# Patient Record
Sex: FEMALE | Race: WHITE | Employment: FULL TIME | URBAN - METROPOLITAN AREA
[De-identification: names, ages, dates, MRNs, and addresses within clinical notes are randomized per-mention and may not be internally consistent; named-entity substitution may affect disease eponyms.]

---

## 2017-02-15 PROBLEM — S52.124A CLOSED NONDISPLACED FRACTURE OF HEAD OF RIGHT RADIUS: Status: ACTIVE | Noted: 2017-02-15

## 2017-02-15 PROBLEM — S53.401A SPRAIN OF RIGHT ELBOW: Status: ACTIVE | Noted: 2017-02-15

## 2017-10-02 PROBLEM — E55.9 VITAMIN D DEFICIENCY: Status: ACTIVE | Noted: 2017-10-02

## 2017-10-02 PROBLEM — Z91.199 NON-COMPLIANCE WITH TREATMENT: Status: ACTIVE | Noted: 2017-10-02

## 2017-10-02 PROBLEM — F41.9 ANXIETY: Status: ACTIVE | Noted: 2017-10-02

## 2018-03-06 ENCOUNTER — HOSPITAL ENCOUNTER (INPATIENT)
Dept: INPATIENT UNIT | Age: 53
LOS: 7 days | Discharge: HOME HEALTH | DRG: 481 | End: 2018-03-13
Attending: EMERGENCY MEDICINE | Admitting: ORTHOPAEDIC SURGERY
Payer: COMMERCIAL

## 2018-03-06 DIAGNOSIS — S72.002A CLOSED FRACTURE OF LEFT HIP, INITIAL ENCOUNTER (HCC): ICD-10-CM

## 2018-03-06 DIAGNOSIS — S72.002A CLOSED FRACTURE OF NECK OF LEFT FEMUR, INITIAL ENCOUNTER (HCC): Primary | ICD-10-CM

## 2018-03-06 PROCEDURE — 9990 CHARGE CONVERSION

## 2018-03-06 PROCEDURE — 99285 EMERGENCY DEPT VISIT HI MDM: CPT

## 2018-03-06 PROCEDURE — 82962 GLUCOSE BLOOD TEST: CPT

## 2018-03-06 PROCEDURE — 51702 INSERT TEMP BLADDER CATH: CPT

## 2018-03-06 PROCEDURE — 93005 ELECTROCARDIOGRAM TRACING: CPT

## 2018-03-06 RX ORDER — FENTANYL CITRATE 50 UG/ML
50 INJECTION, SOLUTION INTRAMUSCULAR; INTRAVENOUS ONCE
Status: COMPLETED | OUTPATIENT
Start: 2018-03-06 | End: 2018-03-06

## 2018-03-06 RX ADMIN — FENTANYL CITRATE 50 MCG: 50 INJECTION, SOLUTION INTRAMUSCULAR; INTRAVENOUS at 23:54

## 2018-03-06 ASSESSMENT — PAIN SCALES - GENERAL
PAINLEVEL_OUTOF10: 10
PAINLEVEL_OUTOF10: 3

## 2018-03-06 ASSESSMENT — PAIN DESCRIPTION - DESCRIPTORS: DESCRIPTORS: ACHING

## 2018-03-06 ASSESSMENT — ENCOUNTER SYMPTOMS
WHEEZING: 0
EYE REDNESS: 0
NAUSEA: 0
BACK PAIN: 0
EYE PAIN: 0
SINUS PRESSURE: 0
SHORTNESS OF BREATH: 0
EYE DISCHARGE: 0
ABDOMINAL DISTENTION: 0
VOMITING: 0
COUGH: 0
SORE THROAT: 0
DIARRHEA: 0

## 2018-03-06 ASSESSMENT — PAIN DESCRIPTION - ORIENTATION: ORIENTATION: LEFT

## 2018-03-06 ASSESSMENT — PAIN DESCRIPTION - PAIN TYPE: TYPE: ACUTE PAIN

## 2018-03-06 ASSESSMENT — PAIN DESCRIPTION - FREQUENCY: FREQUENCY: CONTINUOUS

## 2018-03-06 ASSESSMENT — PAIN DESCRIPTION - LOCATION: LOCATION: HIP

## 2018-03-07 LAB
ABO/RH: NORMAL
ALBUMIN SERPL-MCNC: 4 G/DL (ref 3.5–5.2)
ALP BLD-CCNC: 86 U/L (ref 35–104)
ALT SERPL-CCNC: 10 U/L (ref 0–32)
ANION GAP SERPL CALCULATED.3IONS-SCNC: 12 MMOL/L (ref 7–16)
ANTIBODY SCREEN: NORMAL
APTT: 31.2 SEC (ref 24.5–35.1)
AST SERPL-CCNC: 16 U/L (ref 0–31)
BASOPHILS ABSOLUTE: 0.06 E9/L (ref 0–0.2)
BASOPHILS RELATIVE PERCENT: 0.4 % (ref 0–2)
BILIRUB SERPL-MCNC: 0.3 MG/DL (ref 0–1.2)
BUN BLDV-MCNC: 15 MG/DL (ref 6–20)
CALCIUM SERPL-MCNC: 9.1 MG/DL (ref 8.6–10.2)
CHLORIDE BLD-SCNC: 103 MMOL/L (ref 98–107)
CO2: 27 MMOL/L (ref 22–29)
CREAT SERPL-MCNC: 0.8 MG/DL (ref 0.5–1)
EKG ATRIAL RATE: 68 BPM
EKG P AXIS: 24 DEGREES
EKG P-R INTERVAL: 104 MS
EKG Q-T INTERVAL: 434 MS
EKG QRS DURATION: 100 MS
EKG QTC CALCULATION (BAZETT): 461 MS
EKG R AXIS: 67 DEGREES
EKG T AXIS: 59 DEGREES
EKG VENTRICULAR RATE: 68 BPM
EOSINOPHILS ABSOLUTE: 0.12 E9/L (ref 0.05–0.5)
EOSINOPHILS RELATIVE PERCENT: 0.8 % (ref 0–6)
GFR AFRICAN AMERICAN: >60
GFR NON-AFRICAN AMERICAN: >60 ML/MIN/1.73
GLUCOSE BLD-MCNC: 96 MG/DL (ref 74–109)
HBA1C MFR BLD: 10 % (ref 4.8–5.9)
HCT VFR BLD CALC: 38 % (ref 34–48)
HEMOGLOBIN: 12.7 G/DL (ref 11.5–15.5)
IMMATURE GRANULOCYTES #: 0.1 E9/L
IMMATURE GRANULOCYTES %: 0.6 % (ref 0–5)
INR BLD: 1
LYMPHOCYTES ABSOLUTE: 1.49 E9/L (ref 1.5–4)
LYMPHOCYTES RELATIVE PERCENT: 9.3 % (ref 20–42)
MCH RBC QN AUTO: 31.8 PG (ref 26–35)
MCHC RBC AUTO-ENTMCNC: 33.4 % (ref 32–34.5)
MCV RBC AUTO: 95.2 FL (ref 80–99.9)
METER GLUCOSE: 102 MG/DL (ref 70–110)
METER GLUCOSE: 132 MG/DL (ref 70–110)
METER GLUCOSE: 141 MG/DL (ref 70–110)
METER GLUCOSE: 156 MG/DL (ref 70–110)
METER GLUCOSE: 171 MG/DL (ref 70–110)
METER GLUCOSE: 384 MG/DL (ref 70–110)
METER GLUCOSE: 96 MG/DL (ref 70–110)
MONOCYTES ABSOLUTE: 0.67 E9/L (ref 0.1–0.95)
MONOCYTES RELATIVE PERCENT: 4.2 % (ref 2–12)
NEUTROPHILS ABSOLUTE: 13.5 E9/L (ref 1.8–7.3)
NEUTROPHILS RELATIVE PERCENT: 84.7 % (ref 43–80)
PDW BLD-RTO: 12.8 FL (ref 11.5–15)
PLATELET # BLD: 258 E9/L (ref 130–450)
PMV BLD AUTO: 10.2 FL (ref 7–12)
POTASSIUM SERPL-SCNC: 3.9 MMOL/L (ref 3.5–5)
PROTHROMBIN TIME: 10.9 SEC (ref 9.3–12.4)
RBC # BLD: 3.99 E12/L (ref 3.5–5.5)
SODIUM BLD-SCNC: 142 MMOL/L (ref 132–146)
TOTAL PROTEIN: 7 G/DL (ref 6.4–8.3)
WBC # BLD: 15.9 E9/L (ref 4.5–11.5)

## 2018-03-07 PROCEDURE — 86900 BLOOD TYPING SEROLOGIC ABO: CPT

## 2018-03-07 PROCEDURE — 9990 CHARGE CONVERSION

## 2018-03-07 PROCEDURE — 86901 BLOOD TYPING SEROLOGIC RH(D): CPT

## 2018-03-07 PROCEDURE — 80053 COMPREHEN METABOLIC PANEL: CPT

## 2018-03-07 PROCEDURE — 85025 COMPLETE CBC W/AUTO DIFF WBC: CPT

## 2018-03-07 PROCEDURE — 76000 FLUOROSCOPY <1 HR PHYS/QHP: CPT

## 2018-03-07 PROCEDURE — 86850 RBC ANTIBODY SCREEN: CPT

## 2018-03-07 PROCEDURE — 85610 PROTHROMBIN TIME: CPT

## 2018-03-07 PROCEDURE — 0QS736Z REPOSITION LEFT UPPER FEMUR WITH INTRAMEDULLARY INTERNAL FIXATION DEVICE, PERCUTANEOUS APPROACH: ICD-10-PCS | Performed by: ORTHOPAEDIC SURGERY

## 2018-03-07 PROCEDURE — 83036 HEMOGLOBIN GLYCOSYLATED A1C: CPT

## 2018-03-07 PROCEDURE — 82962 GLUCOSE BLOOD TEST: CPT

## 2018-03-07 PROCEDURE — 85730 THROMBOPLASTIN TIME PARTIAL: CPT

## 2018-03-07 PROCEDURE — 36415 COLL VENOUS BLD VENIPUNCTURE: CPT

## 2018-03-07 RX ORDER — DEXTROSE MONOHYDRATE 50 MG/ML
100 INJECTION, SOLUTION INTRAVENOUS PRN
Status: DISCONTINUED | OUTPATIENT
Start: 2018-03-07 | End: 2018-03-07 | Stop reason: CLARIF

## 2018-03-07 RX ORDER — NICOTINE 21 MG/24HR
1 PATCH, TRANSDERMAL 24 HOURS TRANSDERMAL DAILY
Status: DISCONTINUED | OUTPATIENT
Start: 2018-03-07 | End: 2018-03-08

## 2018-03-07 RX ORDER — MORPHINE SULFATE 10 MG/ML
2 INJECTION, SOLUTION INTRAMUSCULAR; INTRAVENOUS
Status: DISCONTINUED | OUTPATIENT
Start: 2018-03-07 | End: 2018-03-07

## 2018-03-07 RX ORDER — HYDROCODONE BITARTRATE AND ACETAMINOPHEN 5; 325 MG/1; MG/1
2 TABLET ORAL EVERY 4 HOURS PRN
Status: DISCONTINUED | OUTPATIENT
Start: 2018-03-07 | End: 2018-03-13 | Stop reason: HOSPADM

## 2018-03-07 RX ORDER — ACETAMINOPHEN 325 MG/1
650 TABLET ORAL EVERY 4 HOURS PRN
Status: DISCONTINUED | OUTPATIENT
Start: 2018-03-07 | End: 2018-03-13 | Stop reason: HOSPADM

## 2018-03-07 RX ORDER — DOCUSATE SODIUM 100 MG/1
100 CAPSULE, LIQUID FILLED ORAL 2 TIMES DAILY
Status: DISCONTINUED | OUTPATIENT
Start: 2018-03-07 | End: 2018-03-13 | Stop reason: HOSPADM

## 2018-03-07 RX ORDER — ONDANSETRON 2 MG/ML
4 INJECTION INTRAMUSCULAR; INTRAVENOUS EVERY 6 HOURS PRN
Status: DISCONTINUED | OUTPATIENT
Start: 2018-03-07 | End: 2018-03-13 | Stop reason: HOSPADM

## 2018-03-07 RX ORDER — LISINOPRIL 5 MG/1
2.5 TABLET ORAL DAILY
Status: DISCONTINUED | OUTPATIENT
Start: 2018-03-07 | End: 2018-03-07 | Stop reason: CLARIF

## 2018-03-07 RX ORDER — HYDROCODONE BITARTRATE AND ACETAMINOPHEN 5; 325 MG/1; MG/1
1 TABLET ORAL EVERY 4 HOURS PRN
Status: DISCONTINUED | OUTPATIENT
Start: 2018-03-07 | End: 2018-03-13 | Stop reason: HOSPADM

## 2018-03-07 RX ORDER — SODIUM CHLORIDE 0.9 % (FLUSH) 0.9 %
10 SYRINGE (ML) INJECTION PRN
Status: DISCONTINUED | OUTPATIENT
Start: 2018-03-07 | End: 2018-03-07

## 2018-03-07 RX ORDER — NICOTINE POLACRILEX 4 MG
15 LOZENGE BUCCAL PRN
Status: DISCONTINUED | OUTPATIENT
Start: 2018-03-07 | End: 2018-03-07

## 2018-03-07 RX ORDER — MORPHINE SULFATE 10 MG/ML
4 INJECTION, SOLUTION INTRAMUSCULAR; INTRAVENOUS
Status: DISCONTINUED | OUTPATIENT
Start: 2018-03-07 | End: 2018-03-07

## 2018-03-07 RX ORDER — NICOTINE 21 MG/24HR
1 PATCH, TRANSDERMAL 24 HOURS TRANSDERMAL EVERY 24 HOURS
Status: DISCONTINUED | OUTPATIENT
Start: 2018-03-07 | End: 2018-03-07 | Stop reason: CLARIF

## 2018-03-07 RX ORDER — SODIUM CHLORIDE 0.9 % (FLUSH) 0.9 %
10 SYRINGE (ML) INJECTION PRN
Status: DISCONTINUED | OUTPATIENT
Start: 2018-03-07 | End: 2018-03-13 | Stop reason: HOSPADM

## 2018-03-07 RX ORDER — FENTANYL CITRATE 50 UG/ML
INJECTION, SOLUTION INTRAMUSCULAR; INTRAVENOUS
Status: DISPENSED
Start: 2018-03-07 | End: 2018-03-08

## 2018-03-07 RX ORDER — ATORVASTATIN CALCIUM 10 MG/1
10 TABLET, FILM COATED ORAL DAILY
Status: DISCONTINUED | OUTPATIENT
Start: 2018-03-07 | End: 2018-03-07 | Stop reason: CLARIF

## 2018-03-07 RX ORDER — HYDROMORPHONE HCL 110MG/55ML
0.5 PATIENT CONTROLLED ANALGESIA SYRINGE INTRAVENOUS EVERY 5 MIN PRN
Status: DISCONTINUED | OUTPATIENT
Start: 2018-03-07 | End: 2018-03-07

## 2018-03-07 RX ORDER — NICOTINE POLACRILEX 4 MG
15 LOZENGE BUCCAL PRN
Status: DISCONTINUED | OUTPATIENT
Start: 2018-03-07 | End: 2018-03-07 | Stop reason: CLARIF

## 2018-03-07 RX ORDER — ACETAMINOPHEN 325 MG/1
650 TABLET ORAL EVERY 4 HOURS PRN
Status: DISCONTINUED | OUTPATIENT
Start: 2018-03-07 | End: 2018-03-07 | Stop reason: SDUPTHER

## 2018-03-07 RX ORDER — ONDANSETRON 2 MG/ML
4 INJECTION INTRAMUSCULAR; INTRAVENOUS EVERY 6 HOURS PRN
Status: DISCONTINUED | OUTPATIENT
Start: 2018-03-07 | End: 2018-03-07 | Stop reason: SDUPTHER

## 2018-03-07 RX ORDER — SODIUM CHLORIDE, SODIUM LACTATE, POTASSIUM CHLORIDE, CALCIUM CHLORIDE 600; 310; 30; 20 MG/100ML; MG/100ML; MG/100ML; MG/100ML
INJECTION, SOLUTION INTRAVENOUS CONTINUOUS
Status: DISCONTINUED | OUTPATIENT
Start: 2018-03-07 | End: 2018-03-07

## 2018-03-07 RX ORDER — SODIUM CHLORIDE 0.9 % (FLUSH) 0.9 %
10 SYRINGE (ML) INJECTION EVERY 12 HOURS SCHEDULED
Status: DISCONTINUED | OUTPATIENT
Start: 2018-03-07 | End: 2018-03-07

## 2018-03-07 RX ORDER — DEXTROSE MONOHYDRATE 50 MG/ML
100 INJECTION, SOLUTION INTRAVENOUS PRN
Status: DISCONTINUED | OUTPATIENT
Start: 2018-03-07 | End: 2018-03-12 | Stop reason: SDUPTHER

## 2018-03-07 RX ORDER — HYDROMORPHONE HCL 110MG/55ML
0.25 PATIENT CONTROLLED ANALGESIA SYRINGE INTRAVENOUS EVERY 5 MIN PRN
Status: DISCONTINUED | OUTPATIENT
Start: 2018-03-07 | End: 2018-03-07

## 2018-03-07 RX ORDER — SODIUM CHLORIDE 9 MG/ML
INJECTION, SOLUTION INTRAVENOUS CONTINUOUS
Status: DISCONTINUED | OUTPATIENT
Start: 2018-03-07 | End: 2018-03-10

## 2018-03-07 RX ORDER — MORPHINE SULFATE 10 MG/ML
6 INJECTION, SOLUTION INTRAMUSCULAR; INTRAVENOUS
Status: DISCONTINUED | OUTPATIENT
Start: 2018-03-07 | End: 2018-03-11

## 2018-03-07 RX ORDER — ALBUTEROL SULFATE 90 UG/1
2 AEROSOL, METERED RESPIRATORY (INHALATION) EVERY 6 HOURS PRN
Status: DISCONTINUED | OUTPATIENT
Start: 2018-03-07 | End: 2018-03-13 | Stop reason: HOSPADM

## 2018-03-07 RX ORDER — DEXTROSE MONOHYDRATE 25 G/50ML
12.5 INJECTION, SOLUTION INTRAVENOUS PRN
Status: DISCONTINUED | OUTPATIENT
Start: 2018-03-07 | End: 2018-03-07

## 2018-03-07 RX ORDER — FENTANYL CITRATE 50 UG/ML
25 INJECTION, SOLUTION INTRAMUSCULAR; INTRAVENOUS ONCE
Status: DISCONTINUED | OUTPATIENT
Start: 2018-03-07 | End: 2018-03-07

## 2018-03-07 RX ORDER — FENTANYL CITRATE 50 UG/ML
25 INJECTION, SOLUTION INTRAMUSCULAR; INTRAVENOUS EVERY 5 MIN PRN
Status: DISCONTINUED | OUTPATIENT
Start: 2018-03-07 | End: 2018-03-07

## 2018-03-07 RX ORDER — SODIUM CHLORIDE 0.9 % (FLUSH) 0.9 %
10 SYRINGE (ML) INJECTION EVERY 12 HOURS SCHEDULED
Status: DISCONTINUED | OUTPATIENT
Start: 2018-03-07 | End: 2018-03-07 | Stop reason: CLARIF

## 2018-03-07 RX ORDER — DEXTROSE MONOHYDRATE 25 G/50ML
12.5 INJECTION, SOLUTION INTRAVENOUS PRN
Status: DISCONTINUED | OUTPATIENT
Start: 2018-03-07 | End: 2018-03-07 | Stop reason: CLARIF

## 2018-03-07 RX ORDER — ONDANSETRON 2 MG/ML
4 INJECTION INTRAMUSCULAR; INTRAVENOUS
Status: DISCONTINUED | OUTPATIENT
Start: 2018-03-07 | End: 2018-03-07

## 2018-03-07 RX ORDER — INSULIN GLARGINE 100 [IU]/ML
25 INJECTION, SOLUTION SUBCUTANEOUS NIGHTLY
Status: DISCONTINUED | OUTPATIENT
Start: 2018-03-07 | End: 2018-03-09

## 2018-03-07 RX ORDER — MORPHINE SULFATE 10 MG/ML
2 INJECTION, SOLUTION INTRAMUSCULAR; INTRAVENOUS
Status: DISCONTINUED | OUTPATIENT
Start: 2018-03-07 | End: 2018-03-07 | Stop reason: HOSPADM

## 2018-03-07 RX ORDER — LEVOTHYROXINE SODIUM 112 UG/1
112 TABLET ORAL DAILY
Status: DISCONTINUED | OUTPATIENT
Start: 2018-03-07 | End: 2018-03-07 | Stop reason: CLARIF

## 2018-03-07 RX ORDER — LORAZEPAM 2 MG/ML
1 INJECTION INTRAMUSCULAR ONCE
Status: COMPLETED | OUTPATIENT
Start: 2018-03-07 | End: 2018-03-07

## 2018-03-07 RX ORDER — FENTANYL CITRATE 50 UG/ML
50 INJECTION, SOLUTION INTRAMUSCULAR; INTRAVENOUS EVERY 5 MIN PRN
Status: DISCONTINUED | OUTPATIENT
Start: 2018-03-07 | End: 2018-03-07

## 2018-03-07 RX ORDER — SODIUM CHLORIDE 0.9 % (FLUSH) 0.9 %
10 SYRINGE (ML) INJECTION EVERY 12 HOURS SCHEDULED
Status: DISCONTINUED | OUTPATIENT
Start: 2018-03-07 | End: 2018-03-13 | Stop reason: HOSPADM

## 2018-03-07 RX ORDER — ACETAMINOPHEN 325 MG/1
650 TABLET ORAL EVERY 4 HOURS PRN
Status: DISCONTINUED | OUTPATIENT
Start: 2018-03-07 | End: 2018-03-07 | Stop reason: HOSPADM

## 2018-03-07 RX ADMIN — LORAZEPAM 1 MG: 2 INJECTION INTRAMUSCULAR at 09:14

## 2018-03-07 RX ADMIN — SODIUM CHLORIDE, SODIUM LACTATE, POTASSIUM CHLORIDE, CALCIUM CHLORIDE: 600; 310; 30; 20 INJECTION, SOLUTION INTRAVENOUS at 07:16

## 2018-03-07 RX ADMIN — SODIUM CHLORIDE: 9 INJECTION, SOLUTION INTRAVENOUS at 23:40

## 2018-03-07 RX ADMIN — MORPHINE SULFATE 6 MG: 10 INJECTION, SOLUTION INTRAMUSCULAR; INTRAVENOUS at 09:20

## 2018-03-07 RX ADMIN — INSULIN GLARGINE 25 UNITS: 100 INJECTION, SOLUTION SUBCUTANEOUS at 21:16

## 2018-03-07 RX ADMIN — FENTANYL CITRATE 50 MCG: 50 INJECTION, SOLUTION INTRAMUSCULAR; INTRAVENOUS at 15:30

## 2018-03-07 RX ADMIN — FENTANYL CITRATE 25 MCG: 50 INJECTION, SOLUTION INTRAMUSCULAR; INTRAVENOUS at 15:45

## 2018-03-07 RX ADMIN — MORPHINE SULFATE 4 MG: 10 INJECTION, SOLUTION INTRAMUSCULAR; INTRAVENOUS at 07:26

## 2018-03-07 ASSESSMENT — PAIN DESCRIPTION - PAIN TYPE
TYPE: ACUTE PAIN
TYPE: SURGICAL PAIN

## 2018-03-07 ASSESSMENT — PAIN SCALES - GENERAL
PAINLEVEL_OUTOF10: 10
PAINLEVEL_OUTOF10: 2
PAINLEVEL_OUTOF10: 4
PAINLEVEL_OUTOF10: 10
PAINLEVEL_OUTOF10: 8
PAINLEVEL_OUTOF10: 4
PAINLEVEL_OUTOF10: 4
PAINLEVEL_OUTOF10: 6
PAINLEVEL_OUTOF10: 10

## 2018-03-07 ASSESSMENT — PAIN DESCRIPTION - PROGRESSION
CLINICAL_PROGRESSION: GRADUALLY WORSENING
CLINICAL_PROGRESSION: GRADUALLY IMPROVING
CLINICAL_PROGRESSION: RAPIDLY WORSENING

## 2018-03-07 ASSESSMENT — PAIN DESCRIPTION - FREQUENCY
FREQUENCY: INTERMITTENT
FREQUENCY: CONTINUOUS

## 2018-03-07 ASSESSMENT — PAIN DESCRIPTION - ORIENTATION
ORIENTATION: LEFT

## 2018-03-07 ASSESSMENT — PAIN DESCRIPTION - DESCRIPTORS
DESCRIPTORS: TENDER
DESCRIPTORS: DISCOMFORT;DULL
DESCRIPTORS: ACHING;THROBBING
DESCRIPTORS: SORE

## 2018-03-07 ASSESSMENT — PAIN DESCRIPTION - LOCATION
LOCATION: HIP

## 2018-03-07 ASSESSMENT — PAIN DESCRIPTION - ONSET: ONSET: GRADUAL

## 2018-03-07 NOTE — H&P
w/Device KIT, 1 kit by Does not apply route 4 times daily (with meals and nightly)  lidocaine (LIDODERM) 5 %, Place 1 patch onto the skin daily 12 hours on, 12 hours off. Allergies:  Demerol    Social History:     Smoker this independently. Family History:   No family history on file. PHYSICAL EXAM:  VITALS:  BP (!) 90/52   Pulse 62   Temp 98.6 °F (37 °C) (Oral)   Resp 16   Ht 5' 9\" (1.753 m)   Wt 142 lb (64.4 kg)   LMP 11/08/2011   SpO2 95%   BMI 20.97 kg/m²     HEENT is normal without any trauma. Neck is soft and supple. Chest clear to auscultation with clear breath sounds. Cardiovascular is 2+ both upper lower extremities. She has pain in the outside part of her right elbow but no obvious acute problem or deformity. Abdomen is soft nontender with good bowel sounds. Left hip as severe pain upon any kind of motion she does not have point tenderness around her knee but any time I try to examine the thigh causes pain in her hip. She has no pain around the ankles. ASSESSMENT AND PLAN:      Left Intertrochanteric hip fracture     plan:  Basically the x-rays are an AP and a slight oblique. I need to get better visualization of her femoral neck fracture to see if it could be pinned. If it is displaced she is going to require total hip arthroplasty. It has impaired of the we will get a CT scan would of properly preoperative pain. We discussed the problems with having a broken hip we also discussed fixing it as well as replacing it. We discussed the risks of either medically and with persistent pain and possible leg length discrepancy nonunion and need for further surgery. We also discussed the risk of AVN infection and bursitis.

## 2018-03-07 NOTE — CONSULTS
tablet by mouth Daily 3/2/18  Yes Laurie Lei NP   lisinopril (PRINIVIL;ZESTRIL) 2.5 MG tablet Take 1 tablet by mouth daily 3/2/18  Yes Laurie Lei NP   sertraline (ZOLOFT) 50 MG tablet TAKE 1 TABLET EVERY EVENING 3/2/18  Yes Nara Cash NP   glucose blood VI test strips (ONE TOUCH ULTRA TEST) strip USE TO TEST 3 TO 4 TIMES A DAY AS DIRECTED 3/2/18  Yes Laurie Lei NP   atorvastatin (LIPITOR) 10 MG tablet Take 1 tablet by mouth daily 3/2/18  Yes Laurie Lei NP   nicotine (NICODERM CQ) 21 MG/24HR Place 1 patch onto the skin every 24 hours 3/2/18 3/2/19 Yes Laurie Lei NP   albuterol sulfate HFA (PROAIR HFA) 108 (90 Base) MCG/ACT inhaler Inhale 2 puffs into the lungs every 6 hours as needed for Wheezing 3/2/18  Yes Laurie Lei NP   Blood Glucose Monitoring Suppl (ONE TOUCH ULTRA MINI) w/Device KIT 1 kit by Does not apply route 4 times daily (with meals and nightly) 10/2/17  Yes Silvana HEYDI Villaseñor   lidocaine (LIDODERM) 5 % Place 1 patch onto the skin daily 12 hours on, 12 hours off. 3/2/18   Laurie Lei NP       ALLERGIES:  Demerol    SOCIAL Hx:  Social History     Social History    Marital status:      Spouse name: N/A    Number of children: N/A    Years of education: N/A     Occupational History    Not on file. Social History Main Topics    Smoking status: Current Every Day Smoker     Packs/day: 2.00     Years: 35.00    Smokeless tobacco: Never Used      Comment: smoke 1 1/2 ppd    Alcohol use No    Drug use: No    Sexual activity: Not on file     Other Topics Concern    Not on file     Social History Narrative    No narrative on file       ROS:  General:   Denies chills, fatigue, fever, malaise, night sweats or weight loss    Psychological:   Denies anxiety, disorientation or hallucinations    ENT:    Denies epistaxis, headaches, vertigo or visual changes    Cardiovascular:   Denies any chest pain, irregular heartbeats, or palpitations.  No paroxysmal

## 2018-03-07 NOTE — ED NOTES
Bed: 08  Expected date:   Expected time:   Means of arrival:   Comments:  Gilmar Gonzalez, [facility]  03/06/18 [id number]

## 2018-03-07 NOTE — PLAN OF CARE
Problem: Falls - Risk of  Goal: Absence of falls  Outcome: Ongoing      Problem: Pain:  Goal: Pain level will decrease  Pain level will decrease   Outcome: Ongoing      Problem: Risk for Impaired Skin Integrity  Goal: Tissue integrity - skin and mucous membranes  Structural intactness and normal physiological function of skin and  mucous membranes.    Outcome: Ongoing

## 2018-03-07 NOTE — ANESTHESIA PRE-OP
Department of Anesthesiology  Preprocedure Note       Name:  Romana Kaplan   Age:  45 y.o.  :  1965                                          MRN:  [id number]         Date:  3/7/2018      Surgeon:  Jolynn Mireles    Procedure:  L hip pinning`    Medications prior to admission:   Prior to Admission medications    Medication Sig Start Date End Date Taking?  Authorizing Provider   insulin lispro (HUMALOG KWIKPEN) 100 UNIT/ML pen Inject 10 Units into the skin 3 times daily (before meals) 3/2/18  Yes Blanca Pulido NP   insulin glargine (LANTUS SOLOSTAR) 100 UNIT/ML injection pen Inject 23 Units into the skin nightly 3/2/18  Yes Hailey Cash NP   Insulin Pen Needle (PEN NEEDLES) 31G X 6 MM MISC 1 each by Does not apply route daily 3/2/18  Yes Blanca Pulido NP   Lancets MISC Test TID IDDM 3/2/18  Yes Blanca Pulido NP   levothyroxine (SYNTHROID) 112 MCG tablet Take 1 tablet by mouth Daily 3/2/18  Yes Blanca Pulido NP   lisinopril (PRINIVIL;ZESTRIL) 2.5 MG tablet Take 1 tablet by mouth daily 3/2/18  Yes Blanca Pulido NP   sertraline (ZOLOFT) 50 MG tablet TAKE 1 TABLET EVERY EVENING 3/2/18  Yes Hailey Cash NP   glucose blood VI test strips (ONE TOUCH ULTRA TEST) strip USE TO TEST 3 TO 4 TIMES A DAY AS DIRECTED 3/2/18  Yes Blanca Pulido NP   atorvastatin (LIPITOR) 10 MG tablet Take 1 tablet by mouth daily 3/2/18  Yes Blanca Pulido NP   nicotine (NICODERM CQ) 21 MG/24HR Place 1 patch onto the skin every 24 hours 3/2/18 3/2/19 Yes Hailey Hardy NP   albuterol sulfate HFA (PROAIR HFA) 108 (90 Base) MCG/ACT inhaler Inhale 2 puffs into the lungs every 6 hours as needed for Wheezing 3/2/18  Yes Hailey Hardy NP   Blood Glucose Monitoring Suppl (ONE TOUCH ULTRA MINI) w/Device KIT 1 kit by Does not apply route 4 times daily (with meals and nightly) 10/2/17  Yes Kenneth Segovia CNP   lidocaine (LIDODERM) 5 % Place 1 patch onto the skin daily 12 hours on, 12 hours off. 3/2/18   Blanca Pulido NP 18 144 lb (65.3 kg)   17 141 lb (64 kg)     Body mass index is 20.97 kg/m². Anesthesia Evaluation  Patient summary reviewed no history of anesthetic complications:   Airway: Mallampati: III  TM distance: >3 FB   Neck ROM: full   Dental:    (+) edentulous      Pulmonary:Negative Pulmonary ROS breath sounds clear to auscultation                             Cardiovascular:Negative CV ROS            Rhythm: regular  Rate: normal                    Neuro/Psych:   (+) neuromuscular disease:,             GI/Hepatic/Renal:             Endo/Other:    (+) Diabetesusing insulin, hypothyroidism::., .                 Abdominal:           Vascular:                                       Vital Signs (Current)   Vitals:    18 08   BP: (!) 90/52   Pulse: 62   Resp: 16   Temp: 98.6 °F (37 °C)   SpO2: 95%     Vital Signs Statistics (for past 48 hrs)     Temp  Av.5 °F (36.9 °C)  Min: 98.3 °F (36.8 °C)   Min taken time: 18  Max: 98.7 °F (37.1 °C)   Max taken time: 18 0005  Pulse  Av  Min: 58   Min taken time: 18 0803  Max: 68   Max taken time: 18 2201  Resp  Av.5  Min: 12   Min taken time: 18  Max: 25   Max taken time: 18 0045  BP  Min: 90/52   Min taken time: 18 08  Max: 137/69   Max taken time: 18 2201  SpO2  Av.8 %  Min: 95 %   Min taken time: 18 08  Max: 97 %   Max taken time: 18 0045    BP Readings from Last 3 Encounters:   18 (!) 90/52   18 122/86   17 110/62     BMI  Body mass index is 20.97 kg/m². Estimated body mass index is 20.97 kg/m² as calculated from the following:    Height as of this encounter: 5' 9\" (1.753 m). Weight as of this encounter: 142 lb (64.4 kg).     CBC   Lab Results   Component Value Date    WBC 15.9 2018    RBC 3.99 2018    HGB 12.7 2018    HCT 38.0 2018    MCV 95.2 2018    RDW 12.8 2018     2018     CMP    Lab Results

## 2018-03-08 LAB
HCT VFR BLD CALC: 31.8 % (ref 34–48)
HEMOGLOBIN: 10.3 G/DL (ref 11.5–15.5)
METER GLUCOSE: 102 MG/DL (ref 70–110)
METER GLUCOSE: 188 MG/DL (ref 70–110)
METER GLUCOSE: 257 MG/DL (ref 70–110)
METER GLUCOSE: 90 MG/DL (ref 70–110)

## 2018-03-08 PROCEDURE — 97530 THERAPEUTIC ACTIVITIES: CPT

## 2018-03-08 PROCEDURE — 9990 CHARGE CONVERSION

## 2018-03-08 PROCEDURE — 73501 X-RAY EXAM HIP UNI 1 VIEW: CPT

## 2018-03-08 PROCEDURE — 85014 HEMATOCRIT: CPT

## 2018-03-08 PROCEDURE — 36415 COLL VENOUS BLD VENIPUNCTURE: CPT

## 2018-03-08 PROCEDURE — 97116 GAIT TRAINING THERAPY: CPT

## 2018-03-08 PROCEDURE — 97165 OT EVAL LOW COMPLEX 30 MIN: CPT

## 2018-03-08 PROCEDURE — 97110 THERAPEUTIC EXERCISES: CPT

## 2018-03-08 PROCEDURE — 82962 GLUCOSE BLOOD TEST: CPT

## 2018-03-08 PROCEDURE — 73502 X-RAY EXAM HIP UNI 2-3 VIEWS: CPT

## 2018-03-08 PROCEDURE — 73552 X-RAY EXAM OF FEMUR 2/>: CPT

## 2018-03-08 PROCEDURE — 97162 PT EVAL MOD COMPLEX 30 MIN: CPT

## 2018-03-08 PROCEDURE — 73700 CT LOWER EXTREMITY W/O DYE: CPT

## 2018-03-08 PROCEDURE — G8988 SELF CARE GOAL STATUS: HCPCS

## 2018-03-08 PROCEDURE — G8987 SELF CARE CURRENT STATUS: HCPCS

## 2018-03-08 PROCEDURE — 71045 X-RAY EXAM CHEST 1 VIEW: CPT

## 2018-03-08 PROCEDURE — 85018 HEMOGLOBIN: CPT

## 2018-03-08 PROCEDURE — 97535 SELF CARE MNGMENT TRAINING: CPT

## 2018-03-08 RX ORDER — HYDROCODONE BITARTRATE AND ACETAMINOPHEN 5; 325 MG/1; MG/1
1 TABLET ORAL EVERY 4 HOURS PRN
Qty: 42 TABLET | Refills: 0 | Status: SHIPPED | OUTPATIENT
Start: 2018-03-08 | End: 2018-03-15

## 2018-03-08 RX ORDER — NICOTINE 21 MG/24HR
1 PATCH, TRANSDERMAL 24 HOURS TRANSDERMAL DAILY
Status: DISCONTINUED | OUTPATIENT
Start: 2018-03-08 | End: 2018-03-09

## 2018-03-08 RX ADMIN — HYDROCODONE BITARTRATE AND ACETAMINOPHEN 2 TABLET: 5; 325 TABLET ORAL at 19:27

## 2018-03-08 RX ADMIN — HYDROCODONE BITARTRATE AND ACETAMINOPHEN 1 TABLET: 5; 325 TABLET ORAL at 11:49

## 2018-03-08 RX ADMIN — HYDROCODONE BITARTRATE AND ACETAMINOPHEN 1 TABLET: 5; 325 TABLET ORAL at 08:54

## 2018-03-08 RX ADMIN — HYDROCODONE BITARTRATE AND ACETAMINOPHEN 2 TABLET: 5; 325 TABLET ORAL at 23:41

## 2018-03-08 RX ADMIN — DOCUSATE SODIUM 100 MG: 100 CAPSULE, LIQUID FILLED ORAL at 09:07

## 2018-03-08 RX ADMIN — DOCUSATE SODIUM 100 MG: 100 CAPSULE, LIQUID FILLED ORAL at 19:27

## 2018-03-08 RX ADMIN — SODIUM CHLORIDE: 9 INJECTION, SOLUTION INTRAVENOUS at 23:39

## 2018-03-08 RX ADMIN — SODIUM CHLORIDE: 9 INJECTION, SOLUTION INTRAVENOUS at 15:17

## 2018-03-08 RX ADMIN — ASPIRIN 325 MG: 325 TABLET, DELAYED RELEASE ORAL at 09:07

## 2018-03-08 RX ADMIN — INSULIN GLARGINE 25 UNITS: 100 INJECTION, SOLUTION SUBCUTANEOUS at 20:15

## 2018-03-08 RX ADMIN — SODIUM CHLORIDE: 9 INJECTION, SOLUTION INTRAVENOUS at 06:48

## 2018-03-08 RX ADMIN — INSULIN LISPRO 3 UNITS: 100 INJECTION, SOLUTION INTRAVENOUS; SUBCUTANEOUS at 08:06

## 2018-03-08 RX ADMIN — ASPIRIN 325 MG: 325 TABLET, DELAYED RELEASE ORAL at 19:27

## 2018-03-08 ASSESSMENT — PAIN DESCRIPTION - ONSET: ONSET: ON-GOING

## 2018-03-08 ASSESSMENT — PAIN SCALES - GENERAL
PAINLEVEL_OUTOF10: 10
PAINLEVEL_OUTOF10: 4
PAINLEVEL_OUTOF10: 6
PAINLEVEL_OUTOF10: 3
PAINLEVEL_OUTOF10: 2
PAINLEVEL_OUTOF10: 2
PAINLEVEL_OUTOF10: 10
PAINLEVEL_OUTOF10: 10
PAINLEVEL_OUTOF10: 0

## 2018-03-08 ASSESSMENT — PAIN DESCRIPTION - PAIN TYPE
TYPE: SURGICAL PAIN
TYPE: SURGICAL PAIN

## 2018-03-08 ASSESSMENT — PAIN DESCRIPTION - LOCATION
LOCATION: HIP
LOCATION: HIP

## 2018-03-08 ASSESSMENT — PAIN DESCRIPTION - ORIENTATION
ORIENTATION: LEFT
ORIENTATION: LEFT

## 2018-03-08 ASSESSMENT — PAIN DESCRIPTION - PROGRESSION: CLINICAL_PROGRESSION: GRADUALLY IMPROVING

## 2018-03-08 ASSESSMENT — PAIN DESCRIPTION - DESCRIPTORS
DESCRIPTORS: DISCOMFORT
DESCRIPTORS: ACHING;DISCOMFORT;DULL

## 2018-03-08 ASSESSMENT — PAIN DESCRIPTION - FREQUENCY: FREQUENCY: CONTINUOUS

## 2018-03-08 NOTE — PROGRESS NOTES
Physical Therapy    Room #:  0324/0324-02  Patient Name: Cierra Shea    PHYSICAL THERAPY INITIAL EVALUATION    Tentative placement recommendation: Home with [facility] PT vs subacute rehab  Equipment recommendation:   walker     Plan of care: Patient will be seen Monday-Friday, twice daily; s/s daily for therapeutic exercise, functional retraining, endurance activities, balance exercises, family and patient education. AM-PAC Basic Mobility    Key: Total (1); A lot (2); A little (3): None (4)  How much help from another person is needed. ..  2  1. Turning from your back to your side while in a flat bed without using bed rails?    2  2. Moving from lying on your back to sitting on the side of a flat bed w/o using bed rails? 2  3. Moving to and from a bed to a chair or wheelchair?     2  4. Standing up from a chair using your arms?    1  5. To walk in a hospital room?    1  6. Climbing 3-5 steps with a railing? Raw score: 10/24    Order:  EVALUATE AND TREAT   Diagnosis/Problem list:   1.  Closed fracture of neck of left femur, initial encounter ([facility] [address]    2. Closed fracture of left hip, initial encounter [facility])        Patient Active Problem List   Diagnosis    Hypothyroidism    Sprain of right elbow    Closed nondisplaced fracture of head of right radius    Non-compliance with treatment    Vitamin D deficiency    Anxiety    Closed displaced fracture of left femoral neck ([address]       Past medical history:       Diagnosis Date    Diabetes mellitus ([address]     on meds for 21 years    Thyroid disease     Uncontrolled diabetes mellitus ([address] 12/15/2011   ;      Procedure Laterality Date    CARPAL TUNNEL RELEASE      right    EYE SURGERY      HYSTERECTOMY  dec 2011    lap supracervical hysterectomy with BSO    OTHER SURGICAL HISTORY Left 03/07/2018    ORIF Left Hip    TUBAL LIGATION         The admitting diagnosis and active problem list as listed above have been reviewed prior to the initiation of this

## 2018-03-08 NOTE — PROGRESS NOTES
weight bearing status, bed mobility with assistance to guide lower extremity off bed, sitting balance at EOB for 15 minutes with supervision, transfer training with verbal cues for hand placement, static standing balance with wheeled walker, functional mobility with wheeled walker, verbal cues for walker sequence and safety, assistance for walker negotiation, pt up in bedside chair at end of eval. Education provided for proper positioning of lower extremity in bed. All needs within reach. Rehab Potential: good   Plan of care: Patient will be seen by OT 1-3 times a week for therapeutic activity, ADL re-training, bed mobility, functional transfers, functional mobility, safety and fall prevention, balance and endurance activities, instruction in energy conservation principles, and patient/family education. Patient and/or family understands diagnosis, prognosis, education and plan of care. Pt/family verbalized understanding.      Time Code treatment minutes: Prashanth OTR/L #[id number]

## 2018-03-08 NOTE — PROGRESS NOTES
Occupational Therapy  O.T. Bedside Treatment Note    Date:3/8/2018  Patient Name: Rolando Dakins  MRN: [id number]  : 1965  ROOM #: 0324/0324-02    Problem list / Diagnosis:   Patient Active Problem List   Diagnosis    Hypothyroidism    Sprain of right elbow    Closed nondisplaced fracture of head of right radius    Non-compliance with treatment    Vitamin D deficiency    Anxiety    Closed displaced fracture of left femoral neck ([facility]     Past Medical History:   Past Medical History:   Diagnosis Date    Diabetes mellitus ([facility]     on meds for 21 years    Thyroid disease     [facility]controlled diabetes mellitus ([facility] 12/15/2011     Onset/Medical history: medical history reviewed  Past medical history: reviewed    The admitting diagnosis and active problem list as listed above have been reviewed prior to treatment. Nursing cleared the patient for treatment. Patient is agreeable to treatment. Discharge recommendations: [facility] vs. HHOT with assistance from family  Equipment prescriptions needed: wheeled walker, continue to assess      AM - PAC Daily Activity Inpatient   Key: 1 Total; 2 A Lot; 3 A Little; 4 No Help  How much help from another person does the patient currently need. ..     1. Putting on and taking off regular lower body clothing? 2   2. Bathing (incliding washing, rinsing, drying)? 2   3. Toileting, which includes using toilet, bedpan or urinal? 3   4. Putting on and taking off regular upper body clothing? 3   5. Taking care off personal grooming such as brushing teeth? 3   6. Eating meals? 4      Raw Score: 17       Precautions: falls, Partial Weight Bearing: L LE d/t IM nail fixation    S:  - Pt cleared for treatment through nursing.  - Pain: pt c/o pain in L hip, however pt did not rate pain. Pt c/o fatigue.  - Pt pleasant and cooperative during session. O:    Function Assessment     Status  Goal Comment   Feeding:  Independent   Independent   To bring cup to mouth   Grooming:   Moderate Assist at sink level  Independent   Per eval   UE dressing:  Minimal Assist  Independent  To tie back of gown   LE dressing: Mod Assist Independent  Assist to don pants with use of reacher and cuing for technique; pt flexed anteriorly to reach for waistband of pants 2* to difficulty with use of reacher   Bathing: Moderate Assist  Independent  Per last report   Bed Mobility:       Mod Assist x 2 for supine to sit,   Mod Assist x 2 for scooting,  Maximal Assist x 2 for sit to supine  Independent   Assist for positioning and to don SCD's on RTB   Functional Transfers:    Min Assist x 2 for sit to stand transfer from EOB to wheeled walker with bed height elevated  Independent  Safety: fair   Pt able to complete hygiene while seated on BSC   Functional Mobility: 3 feet x 2 to/from [facility] with wheeled walker and  Min Assist ; 3 ft to [facility] with min A x 2 assistance for walker negotiation. Modified [place]   No LOB noted        A:  -Balance: Sitting: fair        Standing: fair with Minimal Assist x 1-2  with walker  -Endurance: fair - (2* to decreased endurance, strength, pain, fatigue)  -Edema: yes - LLE  -Safety: fair (VC's for joint protection, safety, sequencing, hand placement)  - Pt/family education/training: bed mobility, transfer training, functional mobility, AE for LE dressing, toileting/hygiene, sequencing, hand placement, walker safety, bathroom safety, DME,  ECT's,  joint protection techniques,  ADL retraining, self-feeding  -Pt would benefit from additional ADLs, safety education, balance activities, transfer training, strength exercises, and over all endurance building.     P:  - Plan of care: Patient will be seen by OT 1-3x/wk for therapeutic activity, ADL re-training, bed mobility,functional transfers, functional mobility, safety and fall prevention, balance and endurance activities, instruction and training in energy conservation principles, and   patient and family education.   - Patient and/or family

## 2018-03-09 LAB
ANION GAP SERPL CALCULATED.3IONS-SCNC: 8 MMOL/L (ref 7–16)
BASOPHILS ABSOLUTE: 0.06 E9/L (ref 0–0.2)
BASOPHILS RELATIVE PERCENT: 0.8 % (ref 0–2)
BUN BLDV-MCNC: 11 MG/DL (ref 6–20)
CALCIUM SERPL-MCNC: 8.5 MG/DL (ref 8.6–10.2)
CHLORIDE BLD-SCNC: 107 MMOL/L (ref 98–107)
CO2: 27 MMOL/L (ref 22–29)
CREAT SERPL-MCNC: 0.8 MG/DL (ref 0.5–1)
EOSINOPHILS ABSOLUTE: 0.27 E9/L (ref 0.05–0.5)
EOSINOPHILS RELATIVE PERCENT: 3.5 % (ref 0–6)
GFR AFRICAN AMERICAN: >60
GFR NON-AFRICAN AMERICAN: >60 ML/MIN/1.73
GLUCOSE BLD-MCNC: 51 MG/DL (ref 74–109)
HCT VFR BLD CALC: 29.9 % (ref 34–48)
HEMOGLOBIN: 9.7 G/DL (ref 11.5–15.5)
IMMATURE GRANULOCYTES #: 0.03 E9/L
IMMATURE GRANULOCYTES %: 0.4 % (ref 0–5)
LYMPHOCYTES ABSOLUTE: 2.54 E9/L (ref 1.5–4)
LYMPHOCYTES RELATIVE PERCENT: 33.2 % (ref 20–42)
MCH RBC QN AUTO: 31.6 PG (ref 26–35)
MCHC RBC AUTO-ENTMCNC: 32.4 % (ref 32–34.5)
MCV RBC AUTO: 97.4 FL (ref 80–99.9)
METER GLUCOSE: 115 MG/DL (ref 70–110)
METER GLUCOSE: 182 MG/DL (ref 70–110)
METER GLUCOSE: 61 MG/DL (ref 70–110)
METER GLUCOSE: 64 MG/DL (ref 70–110)
METER GLUCOSE: 83 MG/DL (ref 70–110)
MONOCYTES ABSOLUTE: 0.62 E9/L (ref 0.1–0.95)
MONOCYTES RELATIVE PERCENT: 8.1 % (ref 2–12)
NEUTROPHILS ABSOLUTE: 4.13 E9/L (ref 1.8–7.3)
NEUTROPHILS RELATIVE PERCENT: 54 % (ref 43–80)
PDW BLD-RTO: 12.7 FL (ref 11.5–15)
PLATELET # BLD: 191 E9/L (ref 130–450)
PMV BLD AUTO: 9.8 FL (ref 7–12)
POTASSIUM SERPL-SCNC: 3.8 MMOL/L (ref 3.5–5)
RBC # BLD: 3.07 E12/L (ref 3.5–5.5)
SODIUM BLD-SCNC: 142 MMOL/L (ref 132–146)
WBC # BLD: 7.7 E9/L (ref 4.5–11.5)

## 2018-03-09 PROCEDURE — 97530 THERAPEUTIC ACTIVITIES: CPT

## 2018-03-09 PROCEDURE — 9990 CHARGE CONVERSION

## 2018-03-09 PROCEDURE — 85025 COMPLETE CBC W/AUTO DIFF WBC: CPT

## 2018-03-09 PROCEDURE — 97110 THERAPEUTIC EXERCISES: CPT

## 2018-03-09 PROCEDURE — 80048 BASIC METABOLIC PNL TOTAL CA: CPT

## 2018-03-09 PROCEDURE — 97535 SELF CARE MNGMENT TRAINING: CPT

## 2018-03-09 PROCEDURE — 97116 GAIT TRAINING THERAPY: CPT

## 2018-03-09 PROCEDURE — 36415 COLL VENOUS BLD VENIPUNCTURE: CPT

## 2018-03-09 PROCEDURE — 82962 GLUCOSE BLOOD TEST: CPT

## 2018-03-09 RX ORDER — POLYETHYLENE GLYCOL 3350 17 G/17G
17 POWDER, FOR SOLUTION ORAL DAILY
Status: DISCONTINUED | OUTPATIENT
Start: 2018-03-09 | End: 2018-03-13 | Stop reason: HOSPADM

## 2018-03-09 RX ORDER — LEVOTHYROXINE SODIUM 112 UG/1
112 TABLET ORAL DAILY
Status: DISCONTINUED | OUTPATIENT
Start: 2018-03-09 | End: 2018-03-13 | Stop reason: HOSPADM

## 2018-03-09 RX ORDER — INSULIN GLARGINE 100 [IU]/ML
20 INJECTION, SOLUTION SUBCUTANEOUS NIGHTLY
Status: DISCONTINUED | OUTPATIENT
Start: 2018-03-09 | End: 2018-03-11

## 2018-03-09 RX ORDER — ATORVASTATIN CALCIUM 10 MG/1
10 TABLET, FILM COATED ORAL DAILY
Status: DISCONTINUED | OUTPATIENT
Start: 2018-03-09 | End: 2018-03-13 | Stop reason: HOSPADM

## 2018-03-09 RX ORDER — LISINOPRIL 5 MG/1
2.5 TABLET ORAL DAILY
Status: DISCONTINUED | OUTPATIENT
Start: 2018-03-09 | End: 2018-03-13 | Stop reason: HOSPADM

## 2018-03-09 RX ADMIN — HYDROCODONE BITARTRATE AND ACETAMINOPHEN 2 TABLET: 5; 325 TABLET ORAL at 23:57

## 2018-03-09 RX ADMIN — DOCUSATE SODIUM 100 MG: 100 CAPSULE, LIQUID FILLED ORAL at 22:01

## 2018-03-09 RX ADMIN — HYDROCODONE BITARTRATE AND ACETAMINOPHEN 2 TABLET: 5; 325 TABLET ORAL at 19:43

## 2018-03-09 RX ADMIN — Medication 10 ML: at 22:02

## 2018-03-09 RX ADMIN — ASPIRIN 325 MG: 325 TABLET, DELAYED RELEASE ORAL at 22:01

## 2018-03-09 RX ADMIN — POLYETHYLENE GLYCOL 3350 17 G: 17 POWDER, FOR SOLUTION ORAL at 11:32

## 2018-03-09 RX ADMIN — DOCUSATE SODIUM 100 MG: 100 CAPSULE, LIQUID FILLED ORAL at 08:06

## 2018-03-09 RX ADMIN — ASPIRIN 325 MG: 325 TABLET, DELAYED RELEASE ORAL at 08:06

## 2018-03-09 RX ADMIN — SODIUM CHLORIDE: 9 INJECTION, SOLUTION INTRAVENOUS at 23:40

## 2018-03-09 RX ADMIN — HYDROCODONE BITARTRATE AND ACETAMINOPHEN 2 TABLET: 5; 325 TABLET ORAL at 13:32

## 2018-03-09 RX ADMIN — HYDROCODONE BITARTRATE AND ACETAMINOPHEN 2 TABLET: 5; 325 TABLET ORAL at 08:06

## 2018-03-09 RX ADMIN — SERTRALINE 50 MG: 50 TABLET, FILM COATED ORAL at 23:21

## 2018-03-09 RX ADMIN — LISINOPRIL 2.5 MG: 5 TABLET ORAL at 11:31

## 2018-03-09 RX ADMIN — ATORVASTATIN CALCIUM 10 MG: 10 TABLET, FILM COATED ORAL at 11:31

## 2018-03-09 RX ADMIN — INSULIN LISPRO 1 UNITS: 100 INJECTION, SOLUTION INTRAVENOUS; SUBCUTANEOUS at 11:31

## 2018-03-09 RX ADMIN — LEVOTHYROXINE SODIUM 112 MCG: 112 TABLET ORAL at 11:34

## 2018-03-09 ASSESSMENT — PAIN SCALES - GENERAL
PAINLEVEL_OUTOF10: 5
PAINLEVEL_OUTOF10: 10
PAINLEVEL_OUTOF10: 3
PAINLEVEL_OUTOF10: 4
PAINLEVEL_OUTOF10: 10
PAINLEVEL_OUTOF10: 10
PAINLEVEL_OUTOF10: 3
PAINLEVEL_OUTOF10: 7

## 2018-03-09 ASSESSMENT — PAIN DESCRIPTION - ORIENTATION
ORIENTATION: LEFT

## 2018-03-09 ASSESSMENT — PAIN DESCRIPTION - LOCATION
LOCATION: HIP

## 2018-03-09 ASSESSMENT — PAIN DESCRIPTION - DESCRIPTORS
DESCRIPTORS: ACHING;DISCOMFORT;HEAVINESS
DESCRIPTORS: ACHING;DISCOMFORT;SORE
DESCRIPTORS: ACHING;DISCOMFORT;SORE;TENDER

## 2018-03-09 ASSESSMENT — PAIN DESCRIPTION - FREQUENCY
FREQUENCY: CONTINUOUS
FREQUENCY: CONTINUOUS

## 2018-03-09 ASSESSMENT — PAIN DESCRIPTION - ONSET
ONSET: ON-GOING
ONSET: ON-GOING

## 2018-03-09 ASSESSMENT — PAIN DESCRIPTION - PAIN TYPE
TYPE: ACUTE PAIN;SURGICAL PAIN
TYPE: SURGICAL PAIN
TYPE: SURGICAL PAIN

## 2018-03-09 NOTE — PROGRESS NOTES
Physical Therapy  Physical Therapy  Daily Treatment Note  3/9/2018  [id number]                      Bradley Alejandra   [id number]                              1965    Patient Active Problem List   Diagnosis    Hypothyroidism    Sprain of right elbow    Closed nondisplaced fracture of head of right radius    Non-compliance with treatment    Vitamin D deficiency    Anxiety    Closed displaced fracture of left femoral neck ([facility] [address]       Recommendation for discharge: HHPT vs. Subacute  Equipment prescriptions needed:to be determined  AM-PAC Basic Mobility    Key: total(1); a lot (2); a little (3): none (4)  How much help from another person is needed. ..  3  1. Turning from your back to your side while in a flat bed without using bed rails?    3  2. Moving from lying on your back to sitting on the side of a flat bed w/o using bed rails? 3  3. Moving to and from a bed to a chair or wheelchair?     3  4. Standing up from a chair using your arms?    3  5. To walk in a hospital room?    2  6. Climbing 3-5 steps with a railing? Raw score:  17/24    Precautions: falls, LLE : PWB    S: Patient cleared by nursing for treatment. Patient is agreeable to treatment. Pt c/o pain with L hip. Pain status: (measured on a visual analog scale with 0=no pain and 10=excruciating pain) no number given/10. O: Pt was instructed in and performed the following:   Bed Mobility- Supine to sit-Minimal assistance x 1    Scooting- Minimal Assist x 1    Sit to supine-Minimal assistance x 1   Transfers-sit to stand- Minimal assists x 1    Gait:  Patient ambulated 20 feet x 2 using walker with Minimal assists x 1. V/C for sequencing, PWB on LLE, and safety. Steps: NA  Treatment: ankle pumps, quad sets, glut sets, heel slides, SLR, hip abduction, hip adduction,   x 10 reps. Comment: V/C for technique. Assisted pt on/off BSC. Comment: Call light left by patient. RTB at end of tx session with bed alarm on.   A: Pt able to progress

## 2018-03-09 NOTE — PROGRESS NOTES
Grooming:  Min Assist at sink level  Independent   While standing sinkside to simulate grooming tasks in clinic   UE dressing:  Minimal Assist  Independent  Pt donned shirt at end of session   LE dressing:  Min Assist Independent  Pt donned slippers at beginning of session with min A ; min a for standing balance as pt donned/doffed pants over B hips when toileting   Bathing: Moderate Assist  Independent  Pt education, demonstration and participation with use of DME in clinic for safe transfers. Assist to lift LLE into/out of tub/shower   Bed Mobility:       Min A for supine to sit,   Min A for scooting,  Pt seated in chair at end of session  Independent     Functional Transfers:    Min Assist  for sit to stand transfer from EOB to wheeled walker with bed height elevated; transfer to/from [facility], w/c and multiple surfaces in clinic with min A with walker ; min A to support LLE into/out of simulated car transfer  Independent  Safety: fair   Pt able to complete hygiene while seated on BS   Functional Mobility: 15 feet x 2 in room and 6 ft x 2 in clinic with wheeled walker and  Min Assist  Modified [place]   No LOB noted; cuing for sequencing, joint protection        A:  -Balance: Sitting: fair        Standing: fair with Minimal Assist   with walker  -Endurance: fair - (2* to decreased endurance, strength, pain, fatigue)  -Edema: yes - LLE  -Safety: fair (VC's for joint protection, safety, sequencing, hand placement)  - Pt/family education/training: bed mobility, transfer training, functional mobility, UE/ LE dressing, toileting/hygiene, sequencing, hand placement, walker safety, bathroom safety, DME,  ECT's,  joint protection techniques,  ADL retraining, self-feeding  -Pt would benefit from additional ADLs, safety education, balance activities, transfer training, strength exercises, and over all endurance building.     P:  - Plan of care: Patient will be seen by OT 1-3x/wk for therapeutic activity, ADL

## 2018-03-09 NOTE — PROGRESS NOTES
Department of Orthopedic Surgery  Attending Progress Note      SUBJECTIVE  Patient is sore, but does not like to take pain medicine    OBJECTIVE    Physical    VITALS:  /62   Pulse 66   Temp 98.3 °F (36.8 °C) (Oral)   Resp 16   Ht 5' 9\" (1.753 m)   Wt 142 lb (64.4 kg)   LMP 11/08/2011   SpO2 95%   BMI 20.97 kg/m²   NVI. calves soft and nontender    Data    Hemoglobin/Hematocrit:    Lab Results   Component Value Date    HGB 9.7 03/09/2018    HCT 29.9 03/09/2018     BMP:    Lab Results   Component Value Date     03/09/2018    K 3.8 03/09/2018     03/09/2018    CO2 27 03/09/2018    BUN 11 03/09/2018    LABALBU 4.0 03/07/2018    LABALBU 3.4 12/14/2011    CREATININE 0.8 03/09/2018    CALCIUM 8.5 03/09/2018    GFRAA >60 03/09/2018    LABGLOM >60 03/09/2018    GLUCOSE 51 03/09/2018    GLUCOSE 142 01/08/2012     Current Inpatient Medications    Current Facility-Administered Medications: nicotine (NICODERM CQ) 14 MG/24HR 1 patch, 1 patch, Transdermal, Daily  albuterol sulfate  (90 Base) MCG/ACT inhaler 2 puff, 2 puff, Inhalation, Q6H PRN  acetaminophen (TYLENOL) tablet 650 mg, 650 mg, Oral, Q4H PRN  HYDROcodone-acetaminophen (NORCO) 5-325 MG per tablet 1 tablet, 1 tablet, Oral, Q4H PRN **OR** HYDROcodone-acetaminophen (NORCO) 5-325 MG per tablet 2 tablet, 2 tablet, Oral, Q4H PRN  ondansetron (ZOFRAN) injection 4 mg, 4 mg, Intravenous, Q6H PRN  dextrose 5 % solution, 100 mL/hr, Intravenous, PRN  insulin glargine (LANTUS) injection vial 25 Units, 25 Units, Subcutaneous, Nightly  insulin lispro (HUMALOG) injection vial 0-6 Units, 0-6 Units, Subcutaneous, TID WC  insulin lispro (HUMALOG) injection vial 0-3 Units, 0-3 Units, Subcutaneous, Nightly  insulin lispro (HUMALOG) injection vial 8 Units, 8 Units, Subcutaneous, TID WC  morphine injection 6 mg, 6 mg, Intravenous, Q3H PRN  0.9 % sodium chloride infusion, , Intravenous, Continuous  sodium chloride flush 0.9 % injection 10 mL, 10 mL, Intravenous, 2 times per day  sodium chloride flush 0.9 % injection 10 mL, 10 mL, Intravenous, PRN  docusate sodium (COLACE) capsule 100 mg, 100 mg, Oral, BID  aspirin EC tablet 325 mg, 325 mg, Oral, BID    ASSESSMENT AND PLAN      Left intertrochanteric hip fracture    Will need approval from [facility] for rehab.

## 2018-03-09 NOTE — PROGRESS NOTES
Physical Therapy  Physical Therapy  Daily Treatment Note  3/9/2018  [id number]                      Marily Paul   [id number]                              1965    Patient Active Problem List   Diagnosis    Hypothyroidism    Sprain of right elbow    Closed nondisplaced fracture of head of right radius    Non-compliance with treatment    Vitamin D deficiency    Anxiety    Closed displaced fracture of left femoral neck ([facility] [address]       Recommendation for discharge: HHPT vs. Subacute  Equipment prescriptions needed:to be determined  AM-PAC Basic Mobility    Key: total(1); a lot (2); a little (3): none (4)  How much help from another person is needed. ..  3  1. Turning from your back to your side while in a flat bed without using bed rails?    3  2. Moving from lying on your back to sitting on the side of a flat bed w/o using bed rails? 3  3. Moving to and from a bed to a chair or wheelchair?     3  4. Standing up from a chair using your arms?    3  5. To walk in a hospital room?    2  6. Climbing 3-5 steps with a railing? Raw score:  17/24    Precautions: falls, LLE : PWB    S: Patient cleared by nursing for treatment. Patient is agreeable to treatment. Pt c/o pain with L hip. Pain status: (measured on a visual analog scale with 0=no pain and 10=excruciating pain) no number given/10. O: Pt was instructed in and performed the following:   Bed Mobility- Supine to sit-Minimal assistance x 1    Scooting- Minimal Assist x 1    Sit to supine-NA   Transfers-sit to stand- Minimal assists x 1    Gait:  Patient ambulated 25 feet x 2 using walker with Minimal assists x 1. V/C for sequencing, PWB on LLE, and safety. Steps: NA  Treatment: ankle pumps, LAQ, hip abduction, hip adduction, marching,  x 15 reps. Comment: V/C for technique. Comment: Call light left by patient. Pt in chair at end of tx session and notified nursing.   A: Pt able to progress with increased distance with cueing for sequencing and

## 2018-03-09 NOTE — PROGRESS NOTES
P Quality Flow/Interdisciplinary Rounds Progress Note        Quality Flow Rounds held on March 9, 2018    Disciplines Attending:  Bedside Nurse, ,  and Nursing Unit [facility] 3[facility]6/2018 11:34 PM    Anticipated Discharge Date:  Expected Discharge Date: 03/10/18    Disposition:    Cm Score:  Cm Scale Score: 20    Readmission Risk              Readmission Risk:        3.25       Age 70 or Greater:  0    Admitted from SNF or Requires Paid or Family Care:  0    Currently has CHF,COPD,ARF,CRI,or is on dialysis:  0    Takes more than 5 Prescription Medications:  0    Takes Digoxin,Insulin,Anticoagulants,Narcotics or ASA/Plavix:  [address] in Past 12 Months:  0    On Disability:  0    Patient Considers own Health:  1.25          Discussed patient goal for the day, patient clinical progression, and barriers to discharge.   The following Goal(s) of the Day/Commitment(s) have been identified:  SOV denied due to worker's comp and age; SS to FU re: possible [facility] referral       Cora Daly  March 9, 2018

## 2018-03-09 NOTE — PROGRESS NOTES
Voiding without difficulty. Musculoskeletal:  Negative for myalgias, back pain and arthralgias      Allergy:  Allergies   Allergen Reactions    Demerol Nausea And Vomiting        Medication:  Scheduled Meds:   atorvastatin  10 mg Oral Daily    levothyroxine  112 mcg Oral Daily    lisinopril  2.5 mg Oral Daily    nicotine  1 patch Transdermal Daily    polyethylene glycol  17 g Oral Daily    sertraline  50 mg Oral Daily    insulin glargine  25 Units Subcutaneous Nightly    insulin lispro  0-6 Units Subcutaneous TID WC    insulin lispro  0-3 Units Subcutaneous Nightly    sodium chloride flush  10 mL Intravenous 2 times per day    docusate sodium  100 mg Oral BID    aspirin  325 mg Oral BID     Continuous Infusions:   dextrose      sodium chloride 125 mL/hr at 03/09/18 0800       Objective Data:  CBC:   Recent Labs      03/07/18   0000  03/08/18   0454  03/09/18   0445   WBC  15.9*   --   7.7   HGB  12.7  10.3*  9.7*   PLT  258   --   191     BMP:    Recent Labs      03/07/18   0000  03/09/18   0445   NA  142  142   K  3.9  3.8   CL  103  107   CO2  27  27   BUN  15  11   CREATININE  0.8  0.8   GLUCOSE  96  51*     CMP:    Lab Results   Component Value Date     03/09/2018    K 3.8 03/09/2018     03/09/2018    CO2 27 03/09/2018    BUN 11 03/09/2018    CREATININE 0.8 03/09/2018    GFRAA >60 03/09/2018    LABGLOM >60 03/09/2018    GLUCOSE 51 03/09/2018    GLUCOSE 142 01/08/2012    PROT 7.0 03/07/2018    LABALBU 4.0 03/07/2018    LABALBU 3.4 12/14/2011    CALCIUM 8.5 03/09/2018    BILITOT 0.3 03/07/2018    ALKPHOS 86 03/07/2018    AST 16 03/07/2018    ALT 10 03/07/2018     Hepatic:   Recent Labs      03/07/18   0000   AST  16   ALT  10   BILITOT  0.3   ALKPHOS  86     Troponin: No results for input(s): TROPONINI in the last 72 hours. BNP: No results for input(s): BNP in the last 72 hours. Lipids: No results for input(s): CHOL, HDL in the last 72 hours.     Invalid input(s): LDLCALCU  ABGs: No 8:00 AM   Number of days: 0       Chronic Hospital Medical Problems:  Past Medical History:   Diagnosis Date    Diabetes mellitus ([facility]     on meds for 21 years    Thyroid disease     Uncontrolled diabetes mellitus ([facility] 12/15/2011     Active Problems:    Closed displaced fracture of left femoral neck (HCC)  Resolved Problems:    * No resolved hospital problems. *      Assessment:  1. Left femoral neck fracture secondary to fall s/p IM nail fixation of the left hip performed by Dr. Luis Calzada on 3/7/2018. 2. Poorly controlled insulin-dependent diabetes secondary to severe noncompliance with a HgbA1c of 10.  3. Acute blood loss anemia s/p surgical procedure. 4. Hypothyroidism  5. Essential hypertension  6. Essential tremors. 7. Hyperlipidemia  8. Depression      Plan:   Patient continues to do well. She is working with physical therapy as recommended.  is assisting with discharge planning. Placement has been somewhat difficult secondary to this was a work-related injury and apparently a C9 and claim number needed to be issued in order for consideration for acute rehab and SNF. Also looking at [facility] who has submitted precertification with [employer]. We will await notification of acceptance to a rehab facility. Pain is well controlled post op. Monitor and adjust pain medication as needed. hemoglobin has trended down slightly. We'll continue to monitor and transfuse as warranted. Continue therapies. Discussed glycemic control. We'll have the dietitian work with the patient. Patient's blood sugars have been low and she states that she had not been taking the 8 units 3 times daily before meals therefore this will be discontinued. Her blood sugars accordingly. Will monitor blood sugars and adjust treatment needed, Patient's medications were reviewed/continued/adjusted. Labs as ordered. Please see orders for further plan of care.     More than 50% of my  time was spent counseling and/or coordination of care with the patient and/or family with face to face contact. Time was spent reviewing notes and laboratory data, instructing and counseling the patient  regarding my findings and recommendations and reviewing and answer questions. Bi Sharp DO, F.A.CVanessaOVanessaI.   On 3/9/2018  11:59 AM

## 2018-03-09 NOTE — PROGRESS NOTES
Contacted patient via phone from [facility]. Pt states she has been diagnosed with diabetes for 27 years and is on insulin at home. Pt has a glucometer and checks her blood sugar at home. Pt has no spoken to the floor dietitian regarding her diet. Pt recovering from hip fracture at this time. Spoke with patient's nurse, Farhat Menon, to provide diabetes survival packet. Pt was encouraged to contact diabetes education when she is feeling better to schedule outpatient education. Pt did not comment attending diabetes education at this time, more concerned about \"just trying to figure out how to walk\". Diabetes education will contact patient after discharge. Thank you for consult.

## 2018-03-10 LAB
ANION GAP SERPL CALCULATED.3IONS-SCNC: 10 MMOL/L (ref 7–16)
BASOPHILS ABSOLUTE: 0.05 E9/L (ref 0–0.2)
BASOPHILS RELATIVE PERCENT: 0.7 % (ref 0–2)
BUN BLDV-MCNC: 9 MG/DL (ref 6–20)
CALCIUM SERPL-MCNC: 9 MG/DL (ref 8.6–10.2)
CHLORIDE BLD-SCNC: 102 MMOL/L (ref 98–107)
CO2: 29 MMOL/L (ref 22–29)
CREAT SERPL-MCNC: 0.7 MG/DL (ref 0.5–1)
EOSINOPHILS ABSOLUTE: 0.37 E9/L (ref 0.05–0.5)
EOSINOPHILS RELATIVE PERCENT: 4.8 % (ref 0–6)
GFR AFRICAN AMERICAN: >60
GFR NON-AFRICAN AMERICAN: >60 ML/MIN/1.73
GLUCOSE BLD-MCNC: 174 MG/DL (ref 74–109)
HCT VFR BLD CALC: 29.9 % (ref 34–48)
HEMOGLOBIN: 10 G/DL (ref 11.5–15.5)
IMMATURE GRANULOCYTES #: 0.03 E9/L
IMMATURE GRANULOCYTES %: 0.4 % (ref 0–5)
LYMPHOCYTES ABSOLUTE: 1.9 E9/L (ref 1.5–4)
LYMPHOCYTES RELATIVE PERCENT: 24.8 % (ref 20–42)
MCH RBC QN AUTO: 32.2 PG (ref 26–35)
MCHC RBC AUTO-ENTMCNC: 33.4 % (ref 32–34.5)
MCV RBC AUTO: 96.1 FL (ref 80–99.9)
METER GLUCOSE: 236 MG/DL (ref 70–110)
METER GLUCOSE: 317 MG/DL (ref 70–110)
METER GLUCOSE: 331 MG/DL (ref 70–110)
MONOCYTES ABSOLUTE: 0.67 E9/L (ref 0.1–0.95)
MONOCYTES RELATIVE PERCENT: 8.8 % (ref 2–12)
NEUTROPHILS ABSOLUTE: 4.63 E9/L (ref 1.8–7.3)
NEUTROPHILS RELATIVE PERCENT: 60.5 % (ref 43–80)
PDW BLD-RTO: 12.8 FL (ref 11.5–15)
PLATELET # BLD: 200 E9/L (ref 130–450)
PMV BLD AUTO: 10.3 FL (ref 7–12)
POTASSIUM SERPL-SCNC: 4.1 MMOL/L (ref 3.5–5)
RBC # BLD: 3.11 E12/L (ref 3.5–5.5)
SODIUM BLD-SCNC: 141 MMOL/L (ref 132–146)
WBC # BLD: 7.7 E9/L (ref 4.5–11.5)

## 2018-03-10 PROCEDURE — 2580000003 HC RX 258: Performed by: ORTHOPAEDIC SURGERY

## 2018-03-10 PROCEDURE — 6370000000 HC RX 637 (ALT 250 FOR IP): Performed by: ORTHOPAEDIC SURGERY

## 2018-03-10 PROCEDURE — 6370000000 HC RX 637 (ALT 250 FOR IP): Performed by: INTERNAL MEDICINE

## 2018-03-10 PROCEDURE — 97116 GAIT TRAINING THERAPY: CPT

## 2018-03-10 PROCEDURE — 1200000000 HC SEMI PRIVATE

## 2018-03-10 PROCEDURE — 82962 GLUCOSE BLOOD TEST: CPT

## 2018-03-10 RX ADMIN — HYDROCODONE BITARTRATE AND ACETAMINOPHEN 1 TABLET: 5; 325 TABLET ORAL at 20:56

## 2018-03-10 RX ADMIN — POLYETHYLENE GLYCOL 3350 17 G: 17 POWDER, FOR SOLUTION ORAL at 09:51

## 2018-03-10 RX ADMIN — INSULIN LISPRO 2 UNITS: 100 INJECTION, SOLUTION INTRAVENOUS; SUBCUTANEOUS at 20:59

## 2018-03-10 RX ADMIN — INSULIN LISPRO 2 UNITS: 100 INJECTION, SOLUTION INTRAVENOUS; SUBCUTANEOUS at 16:41

## 2018-03-10 RX ADMIN — Medication 10 ML: at 09:52

## 2018-03-10 RX ADMIN — DOCUSATE SODIUM 100 MG: 100 CAPSULE, LIQUID FILLED ORAL at 20:55

## 2018-03-10 RX ADMIN — SERTRALINE 50 MG: 50 TABLET, FILM COATED ORAL at 20:56

## 2018-03-10 RX ADMIN — HYDROCODONE BITARTRATE AND ACETAMINOPHEN 2 TABLET: 5; 325 TABLET ORAL at 05:46

## 2018-03-10 RX ADMIN — LISINOPRIL 2.5 MG: 5 TABLET ORAL at 09:51

## 2018-03-10 RX ADMIN — ASPIRIN 325 MG: 325 TABLET, DELAYED RELEASE ORAL at 09:51

## 2018-03-10 RX ADMIN — ATORVASTATIN CALCIUM 10 MG: 10 TABLET, FILM COATED ORAL at 09:52

## 2018-03-10 RX ADMIN — INSULIN LISPRO 1 UNITS: 100 INJECTION, SOLUTION INTRAVENOUS; SUBCUTANEOUS at 10:02

## 2018-03-10 RX ADMIN — INSULIN LISPRO 4 UNITS: 100 INJECTION, SOLUTION INTRAVENOUS; SUBCUTANEOUS at 12:38

## 2018-03-10 RX ADMIN — ASPIRIN 325 MG: 325 TABLET, DELAYED RELEASE ORAL at 20:55

## 2018-03-10 RX ADMIN — DOCUSATE SODIUM 100 MG: 100 CAPSULE, LIQUID FILLED ORAL at 09:52

## 2018-03-10 RX ADMIN — INSULIN GLARGINE 20 UNITS: 100 INJECTION, SOLUTION SUBCUTANEOUS at 20:58

## 2018-03-10 RX ADMIN — HYDROCODONE BITARTRATE AND ACETAMINOPHEN 2 TABLET: 5; 325 TABLET ORAL at 16:50

## 2018-03-10 RX ADMIN — HYDROCODONE BITARTRATE AND ACETAMINOPHEN 2 TABLET: 5; 325 TABLET ORAL at 10:01

## 2018-03-10 RX ADMIN — LEVOTHYROXINE SODIUM 112 MCG: 112 TABLET ORAL at 06:00

## 2018-03-10 ASSESSMENT — PAIN DESCRIPTION - PAIN TYPE
TYPE: SURGICAL PAIN

## 2018-03-10 ASSESSMENT — PAIN DESCRIPTION - LOCATION
LOCATION: HIP

## 2018-03-10 ASSESSMENT — PAIN DESCRIPTION - FREQUENCY
FREQUENCY: CONTINUOUS
FREQUENCY: CONTINUOUS

## 2018-03-10 ASSESSMENT — PAIN DESCRIPTION - DESCRIPTORS
DESCRIPTORS: ACHING;DISCOMFORT
DESCRIPTORS: ACHING;DISCOMFORT;DULL
DESCRIPTORS: ACHING;HEAVINESS;STABBING

## 2018-03-10 ASSESSMENT — PAIN SCALES - GENERAL
PAINLEVEL_OUTOF10: 4
PAINLEVEL_OUTOF10: 7
PAINLEVEL_OUTOF10: 8
PAINLEVEL_OUTOF10: 0
PAINLEVEL_OUTOF10: 10
PAINLEVEL_OUTOF10: 5
PAINLEVEL_OUTOF10: 2

## 2018-03-10 ASSESSMENT — PAIN DESCRIPTION - ORIENTATION
ORIENTATION: LEFT

## 2018-03-10 ASSESSMENT — PAIN DESCRIPTION - ONSET
ONSET: ON-GOING
ONSET: ON-GOING

## 2018-03-10 NOTE — PROGRESS NOTES
chair at end of tx session and notified nursing. Tried standing hip flex before ambulation, patient having difficulty. A: Pt  Having difficulty advancing LLE, needed cueing for walker placement and weight shifting to unload LLE before advancing. P: Continue with physical therapy   Paulona Maximo    GOALS to be met in 3 days. Bed mobility-  Minimal assists                                                Transfers-Sit to stand-Minimal assists                Gait:  Patient to ambulate 25 feet using Wheeled Walker with Minimal assists                Steps: Patient to go up and down 4  step(s) using 1 rail(s) with  Minimal assists       Increase rom in affected joints by 10%  Increase strength in affected mm groups by 1/3 grade  Increase balance to allow for improvement towards functional goals. Increase endurance to allow for improvement towards functional goals.

## 2018-03-10 NOTE — PROGRESS NOTES
hours. Lipids: No results for input(s): CHOL, HDL in the last 72 hours. Invalid input(s): LDLCALCU  ABGs: No results found for: PHART, PO2ART, [id number]  INR:   No results for input(s): INR, PROTIME in the last 72 hours. RAD: Xr Hip Left (2-3 Views)    Result Date: 3/7/2018  Reading location:  200 HISTORY: Right hip fracture. AP and crosstable pleural views of the right hip were obtained. There is intramedullary samantha seen within the proximal left femur. There are 2 dynamic nails traversing the left femoral neck. There is no fracture of the pelvis. IMPRESSION 1. Status post ORIF of the left hip fracture. There is anatomical alignment of the fracture fragments. Xr Femur Left (min 2 Views)    Result Date: 3/7/2018  Reading location: 200 INDICATION: Injury, pain FINDINGS: AP and lateral views of the left femur. Hip reported separately. The mid and distal aspects of the left femur are intact. No fracture. Ct Hip Left Wo Contrast    Result Date: 3/7/2018  Reading location: 200 INDICATION: Femur fracture FINDINGS: Axial, sagittal, coronal CT images through the pelvis and hips. There with x-rays 3/6/2017. Automated dose control. Catheter within nondistended urinary bladder. Moderate distention of the rectum with gas and stool. Atherosclerosis. Lower lumbar spondylosis. Sacroiliac joints patent and symmetric. Bony pelvis intact. Normal alignment at the hips without significant joint space narrowing. Slightly comminuted, nondisplaced intertrochanteric fracture proximal left femur. Longitudinal component extends through the base of the lesser trochanter. Fracture proximal left femur. Or Fluoro In Surgery    Result Date: 3/7/2018  LOCATION: 200 EXAM: FL IN SURGERY LESS THAN ONE HOUR COMPARISON: None HISTORY: Open reduction TOTAL FLUOROSCOPY TIME: 28.1 seconds DAP: 2.62 mGy. TECHNIQUE: 3 images were obtained intraoperatively. FINDINGS: Interval placement of a short intramedullary nail and hip screws.  There is medications were reviewed/continued/adjusted. Labs as ordered. Please see orders for further plan of care. More than 50% of my  time was spent counseling and/or coordination of care with the patient and/or family with face to face contact. Time was spent reviewing notes and laboratory data, instructing and counseling the patient  regarding my findings and recommendations and reviewing and answer questions. Bi Sharp DO, F.A.C.OVanessaI.   On 3/10/2018  1:36 PM

## 2018-03-11 LAB
ANION GAP SERPL CALCULATED.3IONS-SCNC: 10 MMOL/L (ref 7–16)
BASOPHILS ABSOLUTE: 0.05 E9/L (ref 0–0.2)
BASOPHILS RELATIVE PERCENT: 0.7 % (ref 0–2)
BUN BLDV-MCNC: 13 MG/DL (ref 6–20)
CALCIUM SERPL-MCNC: 9.4 MG/DL (ref 8.6–10.2)
CHLORIDE BLD-SCNC: 97 MMOL/L (ref 98–107)
CO2: 32 MMOL/L (ref 22–29)
CREAT SERPL-MCNC: 0.8 MG/DL (ref 0.5–1)
EOSINOPHILS ABSOLUTE: 0.4 E9/L (ref 0.05–0.5)
EOSINOPHILS RELATIVE PERCENT: 5.2 % (ref 0–6)
GFR AFRICAN AMERICAN: >60
GFR NON-AFRICAN AMERICAN: >60 ML/MIN/1.73
GLUCOSE BLD-MCNC: 220 MG/DL (ref 74–109)
HCT VFR BLD CALC: 30.6 % (ref 34–48)
HEMOGLOBIN: 10.2 G/DL (ref 11.5–15.5)
IMMATURE GRANULOCYTES #: 0.05 E9/L
IMMATURE GRANULOCYTES %: 0.7 % (ref 0–5)
LYMPHOCYTES ABSOLUTE: 2.07 E9/L (ref 1.5–4)
LYMPHOCYTES RELATIVE PERCENT: 27.1 % (ref 20–42)
MCH RBC QN AUTO: 31.7 PG (ref 26–35)
MCHC RBC AUTO-ENTMCNC: 33.3 % (ref 32–34.5)
MCV RBC AUTO: 95 FL (ref 80–99.9)
METER GLUCOSE: 216 MG/DL (ref 70–110)
METER GLUCOSE: 244 MG/DL (ref 70–110)
METER GLUCOSE: 247 MG/DL (ref 70–110)
METER GLUCOSE: 98 MG/DL (ref 70–110)
MONOCYTES ABSOLUTE: 0.66 E9/L (ref 0.1–0.95)
MONOCYTES RELATIVE PERCENT: 8.7 % (ref 2–12)
NEUTROPHILS ABSOLUTE: 4.4 E9/L (ref 1.8–7.3)
NEUTROPHILS RELATIVE PERCENT: 57.6 % (ref 43–80)
PDW BLD-RTO: 12.6 FL (ref 11.5–15)
PLATELET # BLD: 204 E9/L (ref 130–450)
PMV BLD AUTO: 9.7 FL (ref 7–12)
POTASSIUM SERPL-SCNC: 4.4 MMOL/L (ref 3.5–5)
RBC # BLD: 3.22 E12/L (ref 3.5–5.5)
SODIUM BLD-SCNC: 139 MMOL/L (ref 132–146)
WBC # BLD: 7.6 E9/L (ref 4.5–11.5)

## 2018-03-11 PROCEDURE — 82962 GLUCOSE BLOOD TEST: CPT

## 2018-03-11 PROCEDURE — 6370000000 HC RX 637 (ALT 250 FOR IP): Performed by: INTERNAL MEDICINE

## 2018-03-11 PROCEDURE — 6370000000 HC RX 637 (ALT 250 FOR IP): Performed by: ORTHOPAEDIC SURGERY

## 2018-03-11 PROCEDURE — 36415 COLL VENOUS BLD VENIPUNCTURE: CPT

## 2018-03-11 PROCEDURE — 97116 GAIT TRAINING THERAPY: CPT

## 2018-03-11 PROCEDURE — 2580000003 HC RX 258: Performed by: ORTHOPAEDIC SURGERY

## 2018-03-11 PROCEDURE — 1200000000 HC SEMI PRIVATE

## 2018-03-11 PROCEDURE — 80048 BASIC METABOLIC PNL TOTAL CA: CPT

## 2018-03-11 PROCEDURE — 85025 COMPLETE CBC W/AUTO DIFF WBC: CPT

## 2018-03-11 RX ORDER — INSULIN GLARGINE 100 [IU]/ML
25 INJECTION, SOLUTION SUBCUTANEOUS NIGHTLY
Status: DISCONTINUED | OUTPATIENT
Start: 2018-03-11 | End: 2018-03-13 | Stop reason: HOSPADM

## 2018-03-11 RX ADMIN — ASPIRIN 325 MG: 325 TABLET, DELAYED RELEASE ORAL at 21:20

## 2018-03-11 RX ADMIN — INSULIN GLARGINE 25 UNITS: 100 INJECTION, SOLUTION SUBCUTANEOUS at 21:20

## 2018-03-11 RX ADMIN — POLYETHYLENE GLYCOL 3350 17 G: 17 POWDER, FOR SOLUTION ORAL at 08:47

## 2018-03-11 RX ADMIN — ASPIRIN 325 MG: 325 TABLET, DELAYED RELEASE ORAL at 08:40

## 2018-03-11 RX ADMIN — HYDROCODONE BITARTRATE AND ACETAMINOPHEN 2 TABLET: 5; 325 TABLET ORAL at 19:21

## 2018-03-11 RX ADMIN — INSULIN LISPRO 2 UNITS: 100 INJECTION, SOLUTION INTRAVENOUS; SUBCUTANEOUS at 08:30

## 2018-03-11 RX ADMIN — Medication 10 ML: at 21:20

## 2018-03-11 RX ADMIN — LEVOTHYROXINE SODIUM 112 MCG: 112 TABLET ORAL at 06:56

## 2018-03-11 RX ADMIN — ATORVASTATIN CALCIUM 10 MG: 10 TABLET, FILM COATED ORAL at 08:41

## 2018-03-11 RX ADMIN — HYDROCODONE BITARTRATE AND ACETAMINOPHEN 2 TABLET: 5; 325 TABLET ORAL at 08:47

## 2018-03-11 RX ADMIN — DOCUSATE SODIUM 100 MG: 100 CAPSULE, LIQUID FILLED ORAL at 21:20

## 2018-03-11 RX ADMIN — HYDROCODONE BITARTRATE AND ACETAMINOPHEN 2 TABLET: 5; 325 TABLET ORAL at 04:36

## 2018-03-11 RX ADMIN — INSULIN LISPRO 2 UNITS: 100 INJECTION, SOLUTION INTRAVENOUS; SUBCUTANEOUS at 17:04

## 2018-03-11 RX ADMIN — LISINOPRIL 2.5 MG: 5 TABLET ORAL at 08:40

## 2018-03-11 RX ADMIN — DOCUSATE SODIUM 100 MG: 100 CAPSULE, LIQUID FILLED ORAL at 08:47

## 2018-03-11 RX ADMIN — HYDROCODONE BITARTRATE AND ACETAMINOPHEN 2 TABLET: 5; 325 TABLET ORAL at 12:32

## 2018-03-11 RX ADMIN — SERTRALINE 50 MG: 50 TABLET, FILM COATED ORAL at 21:20

## 2018-03-11 RX ADMIN — INSULIN LISPRO 1 UNITS: 100 INJECTION, SOLUTION INTRAVENOUS; SUBCUTANEOUS at 21:20

## 2018-03-11 ASSESSMENT — PAIN DESCRIPTION - PROGRESSION
CLINICAL_PROGRESSION: NOT CHANGED
CLINICAL_PROGRESSION: GRADUALLY IMPROVING

## 2018-03-11 ASSESSMENT — PAIN DESCRIPTION - LOCATION
LOCATION: HIP

## 2018-03-11 ASSESSMENT — PAIN SCALES - GENERAL
PAINLEVEL_OUTOF10: 3
PAINLEVEL_OUTOF10: 3
PAINLEVEL_OUTOF10: 7
PAINLEVEL_OUTOF10: 8
PAINLEVEL_OUTOF10: 6
PAINLEVEL_OUTOF10: 2
PAINLEVEL_OUTOF10: 3
PAINLEVEL_OUTOF10: 0
PAINLEVEL_OUTOF10: 10

## 2018-03-11 ASSESSMENT — PAIN DESCRIPTION - FREQUENCY
FREQUENCY: INTERMITTENT
FREQUENCY: INTERMITTENT

## 2018-03-11 ASSESSMENT — PAIN DESCRIPTION - ORIENTATION
ORIENTATION: LEFT

## 2018-03-11 ASSESSMENT — PAIN DESCRIPTION - DESCRIPTORS
DESCRIPTORS: ACHING
DESCRIPTORS: ACHING
DESCRIPTORS: ACHING;CONSTANT;DISCOMFORT

## 2018-03-11 ASSESSMENT — PAIN DESCRIPTION - PAIN TYPE
TYPE: SURGICAL PAIN

## 2018-03-11 ASSESSMENT — PAIN DESCRIPTION - ONSET
ONSET: AWAKENED FROM SLEEP
ONSET: GRADUAL

## 2018-03-11 NOTE — PLAN OF CARE
Problem: Pain:  Goal: Pain level will decrease  Pain level will decrease   Outcome: Met This Shift    Goal: Control of chronic pain  Control of chronic pain   Outcome: Met This Shift      Problem: Safety/Fall Precautions  Goal: Knowledge - personal safety  Outcome: Met This Shift      Problem: Pain - Acute:  Goal: Recognizes and communicates pain  Recognizes and communicates pain     Outcome: Met This Shift

## 2018-03-12 LAB
ANION GAP SERPL CALCULATED.3IONS-SCNC: 9 MMOL/L (ref 7–16)
BASOPHILS ABSOLUTE: 0.06 E9/L (ref 0–0.2)
BASOPHILS RELATIVE PERCENT: 0.9 % (ref 0–2)
BUN BLDV-MCNC: 15 MG/DL (ref 6–20)
CALCIUM SERPL-MCNC: 8.8 MG/DL (ref 8.6–10.2)
CHLORIDE BLD-SCNC: 100 MMOL/L (ref 98–107)
CO2: 33 MMOL/L (ref 22–29)
CREAT SERPL-MCNC: 0.8 MG/DL (ref 0.5–1)
EOSINOPHILS ABSOLUTE: 0.43 E9/L (ref 0.05–0.5)
EOSINOPHILS RELATIVE PERCENT: 6.2 % (ref 0–6)
GFR AFRICAN AMERICAN: >60
GFR NON-AFRICAN AMERICAN: >60 ML/MIN/1.73
GLUCOSE BLD-MCNC: 143 MG/DL (ref 74–109)
HCT VFR BLD CALC: 29.9 % (ref 34–48)
HEMOGLOBIN: 10 G/DL (ref 11.5–15.5)
IMMATURE GRANULOCYTES #: 0.03 E9/L
IMMATURE GRANULOCYTES %: 0.4 % (ref 0–5)
LYMPHOCYTES ABSOLUTE: 2.06 E9/L (ref 1.5–4)
LYMPHOCYTES RELATIVE PERCENT: 29.6 % (ref 20–42)
MCH RBC QN AUTO: 31.7 PG (ref 26–35)
MCHC RBC AUTO-ENTMCNC: 33.4 % (ref 32–34.5)
MCV RBC AUTO: 94.9 FL (ref 80–99.9)
METER GLUCOSE: 103 MG/DL (ref 70–110)
METER GLUCOSE: 169 MG/DL (ref 70–110)
METER GLUCOSE: 173 MG/DL (ref 70–110)
METER GLUCOSE: 255 MG/DL (ref 70–110)
METER GLUCOSE: 45 MG/DL (ref 70–110)
MONOCYTES ABSOLUTE: 0.7 E9/L (ref 0.1–0.95)
MONOCYTES RELATIVE PERCENT: 10.1 % (ref 2–12)
NEUTROPHILS ABSOLUTE: 3.68 E9/L (ref 1.8–7.3)
NEUTROPHILS RELATIVE PERCENT: 52.8 % (ref 43–80)
PDW BLD-RTO: 12.7 FL (ref 11.5–15)
PLATELET # BLD: 240 E9/L (ref 130–450)
PMV BLD AUTO: 10 FL (ref 7–12)
POTASSIUM SERPL-SCNC: 4.2 MMOL/L (ref 3.5–5)
RBC # BLD: 3.15 E12/L (ref 3.5–5.5)
SODIUM BLD-SCNC: 142 MMOL/L (ref 132–146)
WBC # BLD: 7 E9/L (ref 4.5–11.5)

## 2018-03-12 PROCEDURE — 97116 GAIT TRAINING THERAPY: CPT

## 2018-03-12 PROCEDURE — 6370000000 HC RX 637 (ALT 250 FOR IP): Performed by: INTERNAL MEDICINE

## 2018-03-12 PROCEDURE — 97110 THERAPEUTIC EXERCISES: CPT

## 2018-03-12 PROCEDURE — 36415 COLL VENOUS BLD VENIPUNCTURE: CPT

## 2018-03-12 PROCEDURE — 97530 THERAPEUTIC ACTIVITIES: CPT

## 2018-03-12 PROCEDURE — 85025 COMPLETE CBC W/AUTO DIFF WBC: CPT

## 2018-03-12 PROCEDURE — 94150 VITAL CAPACITY TEST: CPT

## 2018-03-12 PROCEDURE — 82962 GLUCOSE BLOOD TEST: CPT

## 2018-03-12 PROCEDURE — 6370000000 HC RX 637 (ALT 250 FOR IP): Performed by: ORTHOPAEDIC SURGERY

## 2018-03-12 PROCEDURE — 1200000000 HC SEMI PRIVATE

## 2018-03-12 PROCEDURE — 80048 BASIC METABOLIC PNL TOTAL CA: CPT

## 2018-03-12 PROCEDURE — 2580000003 HC RX 258: Performed by: ORTHOPAEDIC SURGERY

## 2018-03-12 RX ORDER — DEXTROSE MONOHYDRATE 25 G/50ML
12.5 INJECTION, SOLUTION INTRAVENOUS PRN
Status: DISCONTINUED | OUTPATIENT
Start: 2018-03-12 | End: 2018-03-13 | Stop reason: HOSPADM

## 2018-03-12 RX ORDER — INSULIN GLARGINE 100 [IU]/ML
25 INJECTION, SOLUTION SUBCUTANEOUS NIGHTLY
Qty: 1 VIAL | Refills: 3 | Status: SHIPPED | OUTPATIENT
Start: 2018-03-12 | End: 2018-03-13

## 2018-03-12 RX ORDER — NICOTINE POLACRILEX 4 MG
15 LOZENGE BUCCAL PRN
Status: DISCONTINUED | OUTPATIENT
Start: 2018-03-12 | End: 2018-03-13 | Stop reason: HOSPADM

## 2018-03-12 RX ORDER — DEXTROSE MONOHYDRATE 50 MG/ML
100 INJECTION, SOLUTION INTRAVENOUS PRN
Status: DISCONTINUED | OUTPATIENT
Start: 2018-03-12 | End: 2018-03-13 | Stop reason: HOSPADM

## 2018-03-12 RX ADMIN — HYDROCODONE BITARTRATE AND ACETAMINOPHEN 2 TABLET: 5; 325 TABLET ORAL at 10:10

## 2018-03-12 RX ADMIN — HYDROCODONE BITARTRATE AND ACETAMINOPHEN 2 TABLET: 5; 325 TABLET ORAL at 05:34

## 2018-03-12 RX ADMIN — ASPIRIN 325 MG: 325 TABLET, DELAYED RELEASE ORAL at 09:01

## 2018-03-12 RX ADMIN — INSULIN LISPRO 3 UNITS: 100 INJECTION, SOLUTION INTRAVENOUS; SUBCUTANEOUS at 17:56

## 2018-03-12 RX ADMIN — ATORVASTATIN CALCIUM 10 MG: 10 TABLET, FILM COATED ORAL at 09:00

## 2018-03-12 RX ADMIN — DOCUSATE SODIUM 100 MG: 100 CAPSULE, LIQUID FILLED ORAL at 21:07

## 2018-03-12 RX ADMIN — INSULIN LISPRO 4 UNITS: 100 INJECTION, SOLUTION INTRAVENOUS; SUBCUTANEOUS at 17:59

## 2018-03-12 RX ADMIN — LEVOTHYROXINE SODIUM 112 MCG: 112 TABLET ORAL at 05:34

## 2018-03-12 RX ADMIN — INSULIN GLARGINE 25 UNITS: 100 INJECTION, SOLUTION SUBCUTANEOUS at 21:11

## 2018-03-12 RX ADMIN — DOCUSATE SODIUM 100 MG: 100 CAPSULE, LIQUID FILLED ORAL at 09:01

## 2018-03-12 RX ADMIN — LISINOPRIL 2.5 MG: 5 TABLET ORAL at 09:00

## 2018-03-12 RX ADMIN — INSULIN LISPRO 1 UNITS: 100 INJECTION, SOLUTION INTRAVENOUS; SUBCUTANEOUS at 21:11

## 2018-03-12 RX ADMIN — ASPIRIN 325 MG: 325 TABLET, DELAYED RELEASE ORAL at 21:07

## 2018-03-12 RX ADMIN — INSULIN LISPRO 1 UNITS: 100 INJECTION, SOLUTION INTRAVENOUS; SUBCUTANEOUS at 11:32

## 2018-03-12 RX ADMIN — HYDROCODONE BITARTRATE AND ACETAMINOPHEN 2 TABLET: 5; 325 TABLET ORAL at 16:38

## 2018-03-12 RX ADMIN — INSULIN LISPRO 6 UNITS: 100 INJECTION, SOLUTION INTRAVENOUS; SUBCUTANEOUS at 11:36

## 2018-03-12 RX ADMIN — HYDROCODONE BITARTRATE AND ACETAMINOPHEN 2 TABLET: 5; 325 TABLET ORAL at 01:07

## 2018-03-12 RX ADMIN — Medication 10 ML: at 21:08

## 2018-03-12 RX ADMIN — SERTRALINE 50 MG: 50 TABLET, FILM COATED ORAL at 21:09

## 2018-03-12 RX ADMIN — HYDROCODONE BITARTRATE AND ACETAMINOPHEN 2 TABLET: 5; 325 TABLET ORAL at 21:07

## 2018-03-12 RX ADMIN — POLYETHYLENE GLYCOL 3350 17 G: 17 POWDER, FOR SOLUTION ORAL at 09:01

## 2018-03-12 ASSESSMENT — PAIN SCALES - GENERAL
PAINLEVEL_OUTOF10: 0
PAINLEVEL_OUTOF10: 3
PAINLEVEL_OUTOF10: 5
PAINLEVEL_OUTOF10: 7
PAINLEVEL_OUTOF10: 10
PAINLEVEL_OUTOF10: 8
PAINLEVEL_OUTOF10: 5
PAINLEVEL_OUTOF10: 7
PAINLEVEL_OUTOF10: 10
PAINLEVEL_OUTOF10: 0

## 2018-03-12 NOTE — DISCHARGE SUMMARY
Hip Left Wo Contrast    Result Date: 3/7/2018  Reading location: 200 INDICATION: Femur fracture FINDINGS: Axial, sagittal, coronal CT images through the pelvis and hips. There with x-rays 3/6/2017. Automated dose control. Catheter within nondistended urinary bladder. Moderate distention of the rectum with gas and stool. Atherosclerosis. Lower lumbar spondylosis. Sacroiliac joints patent and symmetric. Bony pelvis intact. Normal alignment at the hips without significant joint space narrowing. Slightly comminuted, nondisplaced intertrochanteric fracture proximal left femur. Longitudinal component extends through the base of the lesser trochanter. Fracture proximal left femur. Or Fluoro In Surgery    Result Date: 3/7/2018  LOCATION: 200 EXAM: FL IN SURGERY LESS THAN ONE HOUR COMPARISON: None HISTORY: Open reduction TOTAL FLUOROSCOPY TIME: 28.1 seconds DAP: 2.62 mGy. TECHNIQUE: 3 images were obtained intraoperatively. FINDINGS: Interval placement of a short intramedullary nail and hip screws. There is normal anatomic alignment. Intraoperative images as above. HOSPITAL COURSE:   Ai De La Fuente presented to [facility] emergency department was found to suffer a femoral neck fracture. She underwent orthopedic repair. Adjustments were made with her basal bolus insulin therapy. She worked with the therapy teams and it was determined she would benefit from skilled nursing facility placement for ongoing rehabilitation. Lab work and vital signs stabilized. She was deemed acceptable for discharge today. BRIEF PHYSICAL EXAMINATION AND LABORATORIES ON DAY OF DISCHARGE:  VITALS:  /84   Pulse 72   Temp 98.9 °F (37.2 °C) (Oral)   Resp 16   Ht 5' 9\" (1.753 m)   Wt 142 lb (64.4 kg)   LMP 11/08/2011   SpO2 96%   BMI 20.97 kg/m²     HEENT:  PERRLA. EOMI. Sclera clear. Buccal mucosa moist.    Neck:  Supple. Trachea midline. No thyromegaly. No JVD. No bruits. Heart:  Rhythm regular, rate controlled.   No 4 hours as needed for Pain for up to 7 days. insulin glargine (LANTUS) 100 UNIT/ML injection vial  Inject 25 Units into the skin nightly             insulin lispro (HUMALOG KWIKPEN) 100 UNIT/ML pen  Inject 6 Units into the skin 3 times daily (before meals)             insulin lispro (HUMALOG) 100 UNIT/ML injection vial  Inject 0-6 Units into the skin 3 times daily (with meals)             Insulin Pen Needle (PEN NEEDLES) 31G X 6 MM MISC  1 each by Does not apply route daily             Lancets MISC  Test TID IDDM             levothyroxine (SYNTHROID) 112 MCG tablet  Take 1 tablet by mouth Daily             lidocaine (LIDODERM) 5 %  Place 1 patch onto the skin daily 12 hours on, 12 hours off.             lisinopril (PRINIVIL;ZESTRIL) 2.5 MG tablet  Take 1 tablet by mouth daily             nicotine (NICODERM CQ) 21 MG/24HR  Place 1 patch onto the skin every 24 hours             sertraline (ZOLOFT) 50 MG tablet  TAKE 1 TABLET EVERY EVENING                 FOLLOW UP/INSTRUCTIONS:  · This patient is instructed to follow-up with her primary care physician. · Patient is instructed to follow-up with the consults listed above as directed by them. · she is instructed to resume home medications and take new medications as indicated in the list above. · If the patient has a recurrence of symptoms, she is instructed to go to the ED. Preparing for this patient's discharge, including paperwork, orders, instructions, and meeting with patient did require > 30 minutes.     Lindsay Avila DO     3/12/2018  9:25 AM

## 2018-03-12 NOTE — PROGRESS NOTES
Physical Therapy  Physical Therapy  Daily Treatment Note  3/12/2018  0321/0321-01                      Lulu Eliznodo   [id number]                              1965    Patient Active Problem List   Diagnosis    Hypothyroidism    Sprain of right elbow    Closed nondisplaced fracture of head of right radius    Non-compliance with treatment    Vitamin D deficiency    Anxiety    Closed displaced fracture of left femoral neck ([facility]       Recommendation for discharge: HHPT vs. Subacute  Equipment prescriptions needed:to be determined  AM-PAC Basic Mobility    Key: total(1); a lot (2); a little (3): none (4)  How much help from another person is needed. ..  3  1. Turning from your back to your side while in a flat bed without using bed rails?    3  2. Moving from lying on your back to sitting on the side of a flat bed w/o using bed rails? 3  3. Moving to and from a bed to a chair or wheelchair?     3  4. Standing up from a chair using your arms?    3  5. To walk in a hospital room?    2  6. Climbing 3-5 steps with a railing? Raw score:  17/24    Precautions: falls, LLE : PWB    S: Patient cleared by nursing for treatment. Patient is agreeable to treatment. Pt sitting in chair at start of treatment. Pt c/o pain with L hip. Pain status: (measured on a visual analog scale with 0=no pain and 10=excruciating pain) no number given/10. O: Pt was instructed in and performed the following:   Bed Mobility- Supine to sit- NA    Scooting- NA   Sit to supine- NA   Transfers-sit to stand- Minimal assist x 1   Gait: Patient ambulated 80 feet x 2 using wheeled walker with Minimal assist x 1. V/C for sequencing, PWB on LLE, and safety. Steps: NA  Treatment: Pt ambulated in hallway. Pt performed seated ex's: ankle pumps, LAQ, hip abd/add, marching, AAROM x 15 reps. V/C for technique. Comment: Call light left by patient. Pt in chair at end of tx session and notified nursing.   A: Pt able to progress with increased

## 2018-03-12 NOTE — PROGRESS NOTES
Physical Therapy  Physical Therapy  Daily Treatment Note  3/12/2018  0321/0321-01                      Viet Goncalves   [id number]                              1965    Patient Active Problem List   Diagnosis    Hypothyroidism    Sprain of right elbow    Closed nondisplaced fracture of head of right radius    Non-compliance with treatment    Vitamin D deficiency    Anxiety    Closed displaced fracture of left femoral neck ([facility]       Recommendation for discharge: HHPT vs. Subacute  Equipment prescriptions needed:to be determined  AM-PAC Basic Mobility    Key: total(1); a lot (2); a little (3): none (4)  How much help from another person is needed. ..  3  1. Turning from your back to your side while in a flat bed without using bed rails?    3  2. Moving from lying on your back to sitting on the side of a flat bed w/o using bed rails? 3  3. Moving to and from a bed to a chair or wheelchair?     3  4. Standing up from a chair using your arms?    3  5. To walk in a hospital room?    2  6. Climbing 3-5 steps with a railing? Raw score:  17/24    Precautions: falls, LLE : PWB    S: Patient cleared by nursing for treatment. Patient is agreeable to treatment. Pt c/o pain with L hip. Pain status: (measured on a visual analog scale with 0=no pain and 10=excruciating pain) no number given/10. O: Pt was instructed in and performed the following:   Bed Mobility- Supine to sit- Minimal Assist x 1    Scooting- Minimal Assist x 1   Sit to supine- NA   Transfers-sit to stand- Minimal assist x 1   Gait: Patient ambulated 80 feet x 2 using wheeled walker with Minimal assist x 1. V/C for sequencing, PWB on LLE, and safety. Steps: NA  Treatment: Pt ambulated in hallway. Pt performed supine ex's: ankle pumps, quad sets, glut sets, heel slides, SLR, hip abd/add,  AAROM x 15 reps. V/C for technique. Comment: Call light left by patient. Pt in chair at end of tx session and notified nursing.   A: Pt able to progress with

## 2018-03-12 NOTE — PROGRESS NOTES
Occupational Therapy  O.T. Bedside Treatment Note    Date:3/12/2018  Patient Name: Angela Oseguera  MRN: [id number]  : 1965  ROOM #: [id number]    Problem list / Diagnosis:   Patient Active Problem List   Diagnosis    Hypothyroidism    Sprain of right elbow    Closed nondisplaced fracture of head of right radius    Non-compliance with treatment    Vitamin D deficiency    Anxiety    Closed displaced fracture of left femoral neck ([facility]     Past Medical History:   Past Medical History:   Diagnosis Date    Diabetes mellitus ([facility]     on meds for 21 years    Thyroid disease     [facility] ([facility] 12/15/2011     Onset/Medical history: medical history reviewed  Past medical history: reviewed    The admitting diagnosis and active problem list as listed above have been reviewed prior to treatment. Nursing cleared the patient for treatment. Patient is agreeable to treatment. Discharge recommendations: MISTY vs. HHOT with assistance from family  Equipment prescriptions needed: wheeled walker, continue to assess      AM - PAC Daily Activity Inpatient   Key: 1 Total; 2 A Lot; 3 A Little; 4 No Help  How much help from another person does the patient currently need. ..     1. Putting on and taking off regular lower body clothing? 3   2. Bathing (incliding washing, rinsing, drying)? 2   3. Toileting, which includes using toilet, bedpan or urinal? 3   4. Putting on and taking off regular upper body clothing? 3   5. Taking care off personal grooming such as brushing teeth? 3   6. Eating meals? 4      Raw Score: 18/24       Precautions: falls, Partial Weight Bearing: L LE d/t IM nail fixation    S:  - Pt cleared for treatment through nursing.  - Pain: pt c/o pain in L hip, however pt did not rate pain. Nsg aware. Pt c/o fatigue.   - Pt pleasant and cooperative during session.     O:    Function Assessment     Status  Goal Comment   Feeding:  Independent   Independent   Per eval   Grooming:  Min Assist

## 2018-03-13 VITALS
OXYGEN SATURATION: 93 % | TEMPERATURE: 97.8 F | WEIGHT: 142 LBS | HEART RATE: 71 BPM | BODY MASS INDEX: 21.03 KG/M2 | RESPIRATION RATE: 20 BRPM | HEIGHT: 69 IN | SYSTOLIC BLOOD PRESSURE: 122 MMHG | DIASTOLIC BLOOD PRESSURE: 62 MMHG

## 2018-03-13 LAB
METER GLUCOSE: 266 MG/DL (ref 70–110)
METER GLUCOSE: 50 MG/DL (ref 70–110)
METER GLUCOSE: 68 MG/DL (ref 70–110)

## 2018-03-13 PROCEDURE — 6370000000 HC RX 637 (ALT 250 FOR IP): Performed by: INTERNAL MEDICINE

## 2018-03-13 PROCEDURE — 97110 THERAPEUTIC EXERCISES: CPT

## 2018-03-13 PROCEDURE — 82962 GLUCOSE BLOOD TEST: CPT

## 2018-03-13 PROCEDURE — 97116 GAIT TRAINING THERAPY: CPT

## 2018-03-13 PROCEDURE — 97530 THERAPEUTIC ACTIVITIES: CPT

## 2018-03-13 PROCEDURE — 97535 SELF CARE MNGMENT TRAINING: CPT

## 2018-03-13 PROCEDURE — 6370000000 HC RX 637 (ALT 250 FOR IP): Performed by: ORTHOPAEDIC SURGERY

## 2018-03-13 RX ORDER — INSULIN GLARGINE 100 [IU]/ML
25 INJECTION, SOLUTION SUBCUTANEOUS NIGHTLY
Qty: 1 VIAL | Refills: 3 | Status: SHIPPED | OUTPATIENT
Start: 2018-03-13 | End: 2018-07-27 | Stop reason: ALTCHOICE

## 2018-03-13 RX ADMIN — INSULIN LISPRO 4 UNITS: 100 INJECTION, SOLUTION INTRAVENOUS; SUBCUTANEOUS at 08:23

## 2018-03-13 RX ADMIN — DOCUSATE SODIUM 100 MG: 100 CAPSULE, LIQUID FILLED ORAL at 08:26

## 2018-03-13 RX ADMIN — HYDROCODONE BITARTRATE AND ACETAMINOPHEN 2 TABLET: 5; 325 TABLET ORAL at 08:26

## 2018-03-13 RX ADMIN — HYDROCODONE BITARTRATE AND ACETAMINOPHEN 2 TABLET: 5; 325 TABLET ORAL at 14:07

## 2018-03-13 RX ADMIN — ASPIRIN 325 MG: 325 TABLET, DELAYED RELEASE ORAL at 08:26

## 2018-03-13 RX ADMIN — LEVOTHYROXINE SODIUM 112 MCG: 112 TABLET ORAL at 08:26

## 2018-03-13 RX ADMIN — INSULIN LISPRO 3 UNITS: 100 INJECTION, SOLUTION INTRAVENOUS; SUBCUTANEOUS at 08:22

## 2018-03-13 RX ADMIN — ATORVASTATIN CALCIUM 10 MG: 10 TABLET, FILM COATED ORAL at 08:26

## 2018-03-13 RX ADMIN — LISINOPRIL 2.5 MG: 5 TABLET ORAL at 08:26

## 2018-03-13 RX ADMIN — HYDROCODONE BITARTRATE AND ACETAMINOPHEN 2 TABLET: 5; 325 TABLET ORAL at 04:15

## 2018-03-13 ASSESSMENT — PAIN SCALES - GENERAL
PAINLEVEL_OUTOF10: 6
PAINLEVEL_OUTOF10: 8
PAINLEVEL_OUTOF10: 3
PAINLEVEL_OUTOF10: 10
PAINLEVEL_OUTOF10: 6

## 2018-03-13 ASSESSMENT — PAIN DESCRIPTION - LOCATION: LOCATION: HIP

## 2018-03-13 ASSESSMENT — PAIN DESCRIPTION - FREQUENCY: FREQUENCY: CONTINUOUS

## 2018-03-13 ASSESSMENT — PAIN DESCRIPTION - PROGRESSION: CLINICAL_PROGRESSION: GRADUALLY WORSENING

## 2018-03-13 ASSESSMENT — PAIN DESCRIPTION - PAIN TYPE: TYPE: SURGICAL PAIN

## 2018-03-13 ASSESSMENT — PAIN DESCRIPTION - ONSET: ONSET: ON-GOING

## 2018-03-13 ASSESSMENT — PAIN DESCRIPTION - DESCRIPTORS: DESCRIPTORS: ACHING;DISCOMFORT;DULL

## 2018-03-13 ASSESSMENT — PAIN DESCRIPTION - ORIENTATION: ORIENTATION: LEFT

## 2018-03-13 NOTE — PROGRESS NOTES
home with home health care at this point. Lab work and vital signs are at her baseline. She continues to do well with the therapy teams. Please refer to the discharge summary provided yesterday as I suspect the patient will be discharged today. Continue current therapy. See orders for further plan of care.     Darleen Fink D.O.  9:11 AM  3/13/2018

## 2018-03-13 NOTE — PROGRESS NOTES
Physical Therapy  Physical Therapy  Daily Treatment Note  3/13/2018  0321/0321-01                      Sharon Diaz   [id number]                              1965    Patient Active Problem List   Diagnosis    Hypothyroidism    Sprain of right elbow    Closed nondisplaced fracture of head of right radius    Non-compliance with treatment    Vitamin D deficiency    Anxiety    Closed displaced fracture of left femoral neck ([facility]       Recommendation for discharge: HHPT vs. Subacute  Equipment prescriptions needed:to be determined  AM-PAC Basic Mobility    Key: total(1); a lot (2); a little (3): none (4)  How much help from another person is needed. ..  3  1. Turning from your back to your side while in a flat bed without using bed rails?    3  2. Moving from lying on your back to sitting on the side of a flat bed w/o using bed rails? 3  3. Moving to and from a bed to a chair or wheelchair?     3  4. Standing up from a chair using your arms?    3  5. To walk in a hospital room?    3  6. Climbing 3-5 steps with a railing? Raw score:  18/24    Precautions: falls, LLE : PWB    S: Patient cleared by nursing for treatment. Patient is agreeable to treatment. Pain status: (measured on a visual analog scale with 0=no pain and 10=excruciating pain) no number given/10. O: Pt was instructed in and performed the following:   Bed Mobility- Supine to sit- NA  Pt sitting in a chair at start of tx session. Scooting- NA   Sit to supine- NA   Transfers-sit to stand- Supervision   Gait: Patient ambulated 50 ft x 2  using wheeled walker with Supervision. V/C for sequencing, PWB on LLE, and safety. Steps: Pt ascended/descended 14 steps using 2 rails with Supervision. V/C for sequencing, safety, foot placement,   Treatment: as above,   Comment: Pt in chair at end of PT treatment and notified nursing. Call light left by patient. A: Pt able to perform stairs with no LOB.   P: Continue with physical therapy   KAILEY HARRIS

## 2018-03-13 NOTE — PROGRESS NOTES
P Quality Flow/Interdisciplinary Rounds Progress Note        Quality Flow Rounds held on March 13, 2018    Disciplines Attending:  Bedside Nurse, ,  and [facility] 3[facility] 11:34 PM    Anticipated Discharge Date:  Expected Discharge Date: 03/10/18    Disposition:    Cm Score:  Cm Scale Score: 20    Readmission Risk              Readmission Risk:        3.25       Age 70 or Greater:  0    Admitted from SNF or Requires Paid or Family Care:  0    Currently has CHF,COPD,ARF,CRI,or is on dialysis:  0    Takes more than 5 Prescription Medications:  0    Takes Digoxin,Insulin,Anticoagulants,Narcotics or ASA/Plavix:  [address] in Past 12 Months:  0    On Disability:  0    Patient Considers own Health:  1.25          Discussed patient goal for the day, patient clinical progression, and barriers to discharge.   The following Goal(s) of the Day/Commitment(s) have been identified:  Plan for home with home care today      Anil Caal  March 13, 2018

## 2018-03-14 ENCOUNTER — CARE COORDINATION (OUTPATIENT)
Dept: CASE MANAGEMENT | Age: 53
End: 2018-03-14

## 2018-03-14 DIAGNOSIS — S72.002A CLOSED DISPLACED FRACTURE OF LEFT FEMORAL NECK (HCC): Primary | ICD-10-CM

## 2018-03-14 PROCEDURE — 1111F DSCHRG MED/CURRENT MED MERGE: CPT | Performed by: FAMILY MEDICINE

## 2018-03-14 NOTE — CARE COORDINATION
[facility] Transitions Initial Follow Up Call    Call within 2 business days of discharge: Yes    Patient: Maxwell Guillen Patient : 1965   MRN: [id number]  Reason for Admission: There are no discharge diagnoses documented for the most recent discharge. Discharge Date: 3/13/18 RARS: [facility] Risk Score: 3.25 CMRS: 5.0     Spoke with: Loretta L. Stevens Sandhoff (patient)    Facility: [facility]    Non-face-to-face services provided:  Scheduled appointment with PCP-Patient declines CTC assistance in wanting to schedule HFU with Dr. Christopher Tran (PCP) as she verbalizes wanting to talk with her daughters befor Jeffrey A [address] appointment as they would be her transportation to appt. Scheduled appointment with Jayesh Lemus her intention in calling Dr. Fercho Mack (ortho sx) herself to schedule HFU but wants to talk with daughters first because they would be her transportation to appt. Obtained and reviewed discharge summary and/or continuity of care documents  Establishment or re-establishment of referrals-CTC confirmed with patient that [facility] is scheduled today for first visit .  COnfirmed with patient she obtained BSC and ww from [facility]    Care Transitions 24 Hour Call    Schedule Follow Up Appointment with PCP:  Declined  Do you have any ongoing symptoms?:  Yes  Patient-reported symptoms:  Pain  Interventions for patient-reported symptoms:  Notified PCP/Physician  Do you have a copy of your discharge instructions?:  Yes  Do you have all of your prescriptions and are they filled?:  No  Have you been contacted by a [facility] Pharmacist?:  No  Have you scheduled your follow up appointment?:  No  Were you discharged with any Home Care or Post Acute Services:  Yes  Post Acute Services:  [facility] [place] you feel like you have everything you need to keep you well at home?:  No  Care Transitions Interventions       Spoke with patient today 3/14/18 for initial TCM/hospital discharge follow up for  Closed displaced fracture of left femoral neck. Patient states she is a  at a middle school and she was stripping a floor when the handles on the machine didn't lock causing her to fall. States having orthopedic repair per Dr. Kenneth Anderson (ortho sx) for fracture and chose to return home after discharge with home care. Jorje Nguyen is scheduled for first visit today as physical therapist called who will be opening up case. States she obtained both ww and BSC from [facility]. Confirmed she is partial weight bearing to left leg and is using walker when ambulating. States having constant left leg pain that is worse when standing with walker. States \" it feels like I'm being hit with a hammer\". Rates pain a 6/10 in severity on pain scale. States she gets pain relief from prescribed [place]. States post op bandage is dry and intact. Denies any shortness of breath, chest pain, nausea, vomiting, chills or fever. States she monitors blood sugar three times daily before meals and admits FBS today was 104. States she is consistent with taking Lantus and Humalog. Noted last A1C was 10.0 on 3/7/18. States she is tolerating eating and drinking. Denies any other complaints or concerns at this time. Maria Guadalupe declines CTC assistance with scheduling HFU visit. States she wants to talk with her daughters before scheduling as they will be her transportation to appointments. States she will Dr. Ade Rivas (PCP) and Dr. Kenneth Anderson (ortho sx) herself to make appointments after conferring with daughters. Confirmed patient has doctors contact numbers listed on AVS which she acknowledged. Patient receptive for CTC to continue following for Care Transition.      Follow Up  Future Appointments  Date Time Provider Ashley Soria   7/30/2018 2:30 PM Aaron Crowe MD [facility], [facility]. [facility]       SHAUNA [place]

## 2018-03-16 ENCOUNTER — TELEPHONE (OUTPATIENT)
Dept: FAMILY MEDICINE CLINIC | Age: 53
End: 2018-03-16

## 2018-03-16 DIAGNOSIS — S72.002A CLOSED DISPLACED FRACTURE OF LEFT FEMORAL NECK (HCC): Primary | ICD-10-CM

## 2018-03-20 DIAGNOSIS — E03.9 HYPOTHYROIDISM, UNSPECIFIED TYPE: Primary | ICD-10-CM

## 2018-03-21 ENCOUNTER — CARE COORDINATION (OUTPATIENT)
Dept: CASE MANAGEMENT | Age: 53
End: 2018-03-21

## 2018-03-21 NOTE — CARE COORDINATION
[facility] Transitions Follow Up Call    3/21/2018    Patient: Natali Hendrickson  Patient : 1965   MRN: [id number]  Reason for Admission: There are no discharge diagnoses documented for the most recent discharge. Discharge Date: 3/13/18 RARS: Risk Score: 3.25 CMRS: 5.0     Attempted to contact patient today 3/21/18 for TCM/hospital discharge follow up sub call for losed displaced fracture of left femoral neck and s/p left hip pinning per Dr. Yoltete Bowden (ortho sx). Left message on home/mobile number requesting return call back to [facility] and provided contact information. CTC will continue with patient outreach for Care Transition.      Follow Up  Future Appointments  Date Time Provider Ashley Soria   2018 2:30 PM Angie Jones MD [facility]       [employer] [place] SHAUNA Kruger

## 2018-03-23 ENCOUNTER — TELEPHONE (OUTPATIENT)
Dept: FAMILY MEDICINE CLINIC | Age: 53
End: 2018-03-23

## 2018-03-23 DIAGNOSIS — R41.82 ALTERED MENTAL STATUS, UNSPECIFIED ALTERED MENTAL STATUS TYPE: Primary | ICD-10-CM

## 2018-03-23 RX ORDER — BLOOD SUGAR DIAGNOSTIC
1 STRIP MISCELLANEOUS DAILY
Qty: 100 EACH | Refills: 3 | Status: SHIPPED | OUTPATIENT
Start: 2018-03-23

## 2018-03-23 RX ORDER — LISINOPRIL 2.5 MG/1
2.5 TABLET ORAL DAILY
Qty: 90 TABLET | Refills: 1 | Status: SHIPPED | OUTPATIENT
Start: 2018-03-23 | End: 2018-07-27 | Stop reason: SDUPTHER

## 2018-03-23 RX ORDER — ATORVASTATIN CALCIUM 10 MG/1
10 TABLET, FILM COATED ORAL DAILY
Qty: 90 TABLET | Refills: 1 | Status: SHIPPED | OUTPATIENT
Start: 2018-03-23 | End: 2018-07-27 | Stop reason: SDUPTHER

## 2018-04-03 ENCOUNTER — CARE COORDINATION (OUTPATIENT)
Dept: CASE MANAGEMENT | Age: 53
End: 2018-04-03

## 2018-04-05 ENCOUNTER — CARE COORDINATION (OUTPATIENT)
Dept: CARE COORDINATION | Age: 53
End: 2018-04-05

## 2018-04-11 ENCOUNTER — CARE COORDINATION (OUTPATIENT)
Dept: CARE COORDINATION | Age: 53
End: 2018-04-11

## 2018-04-18 ENCOUNTER — CARE COORDINATION (OUTPATIENT)
Dept: CARE COORDINATION | Age: 53
End: 2018-04-18

## 2018-04-18 DIAGNOSIS — Z91.199 NON-COMPLIANCE WITH TREATMENT: Primary | ICD-10-CM

## 2018-04-18 DIAGNOSIS — Z79.4 TYPE 2 DIABETES MELLITUS WITH HYPOGLYCEMIA WITHOUT COMA, WITH LONG-TERM CURRENT USE OF INSULIN (HCC): ICD-10-CM

## 2018-04-18 DIAGNOSIS — E11.649 TYPE 2 DIABETES MELLITUS WITH HYPOGLYCEMIA WITHOUT COMA, WITH LONG-TERM CURRENT USE OF INSULIN (HCC): ICD-10-CM

## 2018-05-29 ENCOUNTER — CARE COORDINATION (OUTPATIENT)
Dept: CARE COORDINATION | Age: 53
End: 2018-05-29

## 2018-06-08 ENCOUNTER — TELEPHONE (OUTPATIENT)
Dept: FAMILY MEDICINE CLINIC | Age: 53
End: 2018-06-08

## 2018-06-21 ENCOUNTER — CARE COORDINATION (OUTPATIENT)
Dept: CARE COORDINATION | Age: 53
End: 2018-06-21

## 2018-06-27 ENCOUNTER — CARE COORDINATION (OUTPATIENT)
Dept: CARE COORDINATION | Age: 53
End: 2018-06-27

## 2018-07-02 ENCOUNTER — HOSPITAL ENCOUNTER (OUTPATIENT)
Age: 53
Discharge: HOME | End: 2018-07-02
Payer: COMMERCIAL

## 2018-07-02 DIAGNOSIS — R41.82 ALTERED MENTAL STATUS, UNSPECIFIED ALTERED MENTAL STATUS TYPE: ICD-10-CM

## 2018-07-02 DIAGNOSIS — E03.9 HYPOTHYROIDISM, UNSPECIFIED TYPE: ICD-10-CM

## 2018-07-02 LAB
BILIRUBIN URINE: NEGATIVE
BLOOD, URINE: NEGATIVE
CLARITY: CLEAR
COLOR: YELLOW
CREATININE URINE: 94 MG/DL (ref 29–226)
FOLATE: 19.5 NG/ML (ref 4.8–24.2)
GLUCOSE URINE: >=1000 MG/DL
KETONES, URINE: NEGATIVE MG/DL
LEUKOCYTE ESTERASE, URINE: NEGATIVE
MICROALBUMIN UR-MCNC: <12 MG/L
MICROALBUMIN/CREAT UR-RTO: ABNORMAL (ref 0–30)
NITRITE, URINE: NEGATIVE
PH UA: 5.5 (ref 5–9)
PROTEIN UA: NEGATIVE MG/DL
SPECIFIC GRAVITY UA: 1.02 (ref 1–1.03)
TSH SERPL DL<=0.05 MIU/L-ACNC: 16.44 UIU/ML (ref 0.27–4.2)
UROBILINOGEN, URINE: 0.2 E.U./DL
VITAMIN B-12: 195 PG/ML (ref 211–946)

## 2018-07-02 PROCEDURE — 82570 ASSAY OF URINE CREATININE: CPT

## 2018-07-02 PROCEDURE — 84439 ASSAY OF FREE THYROXINE: CPT

## 2018-07-02 PROCEDURE — 82044 UR ALBUMIN SEMIQUANTITATIVE: CPT

## 2018-07-02 PROCEDURE — 84443 ASSAY THYROID STIM HORMONE: CPT

## 2018-07-02 PROCEDURE — 82746 ASSAY OF FOLIC ACID SERUM: CPT

## 2018-07-02 PROCEDURE — 36415 COLL VENOUS BLD VENIPUNCTURE: CPT

## 2018-07-02 PROCEDURE — 81003 URINALYSIS AUTO W/O SCOPE: CPT

## 2018-07-02 PROCEDURE — 82607 VITAMIN B-12: CPT

## 2018-07-03 ENCOUNTER — CARE COORDINATION (OUTPATIENT)
Dept: CARE COORDINATION | Age: 53
End: 2018-07-03

## 2018-07-03 LAB — T4 FREE: 1.01 NG/DL (ref 0.93–1.7)

## 2018-07-03 NOTE — CARE COORDINATION
Patient called for care coordination  - states that did get my letter and phone messages  - explained with patient what care coordination is what services we offer. States that does not know more I could offer that she had not already had. - states that has been a diabetic for 27 years and has \"heard everything\" states that there is not something about diabetes that she does not know. 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 It has been that way and she accepts that. - states that has an appointment with an endocrinologist in August and that she does not want to change her insulin  - asked if could meet with patient at her next appointment with NP. Patient is open to that but at this time not sure if wants participate in care coordination.

## 2018-07-10 DIAGNOSIS — E03.9 HYPOTHYROIDISM, UNSPECIFIED TYPE: Primary | Chronic | ICD-10-CM

## 2018-07-10 RX ORDER — LEVOTHYROXINE SODIUM 0.12 MG/1
125 TABLET ORAL DAILY
Qty: 30 TABLET | Refills: 3 | Status: SHIPPED | OUTPATIENT
Start: 2018-07-10 | End: 2018-07-27 | Stop reason: ALTCHOICE

## 2018-07-27 ENCOUNTER — OFFICE VISIT (OUTPATIENT)
Dept: FAMILY MEDICINE CLINIC | Age: 53
End: 2018-07-27
Payer: COMMERCIAL

## 2018-07-27 ENCOUNTER — CARE COORDINATION (OUTPATIENT)
Dept: CARE COORDINATION | Age: 53
End: 2018-07-27

## 2018-07-27 VITALS
HEIGHT: 69 IN | TEMPERATURE: 98.3 F | WEIGHT: 144 LBS | SYSTOLIC BLOOD PRESSURE: 98 MMHG | OXYGEN SATURATION: 98 % | DIASTOLIC BLOOD PRESSURE: 58 MMHG | HEART RATE: 64 BPM | BODY MASS INDEX: 21.33 KG/M2 | RESPIRATION RATE: 16 BRPM

## 2018-07-27 DIAGNOSIS — Z91.199 NONCOMPLIANCE: ICD-10-CM

## 2018-07-27 DIAGNOSIS — E53.8 VITAMIN B12 DEFICIENCY: ICD-10-CM

## 2018-07-27 DIAGNOSIS — R06.02 SOB (SHORTNESS OF BREATH) ON EXERTION: ICD-10-CM

## 2018-07-27 DIAGNOSIS — E03.9 HYPOTHYROIDISM, UNSPECIFIED TYPE: Chronic | ICD-10-CM

## 2018-07-27 LAB — HBA1C MFR BLD: 10.3 %

## 2018-07-27 PROCEDURE — 99214 OFFICE O/P EST MOD 30 MIN: CPT | Performed by: NURSE PRACTITIONER

## 2018-07-27 PROCEDURE — 83036 HEMOGLOBIN GLYCOSYLATED A1C: CPT | Performed by: NURSE PRACTITIONER

## 2018-07-27 PROCEDURE — 96372 THER/PROPH/DIAG INJ SC/IM: CPT | Performed by: NURSE PRACTITIONER

## 2018-07-27 RX ORDER — LEVOTHYROXINE SODIUM 125 MCG
125 TABLET ORAL DAILY
Qty: 30 TABLET | Refills: 3 | Status: SHIPPED | OUTPATIENT
Start: 2018-07-27

## 2018-07-27 RX ORDER — LISINOPRIL 2.5 MG/1
2.5 TABLET ORAL DAILY
Qty: 90 TABLET | Refills: 1 | Status: SHIPPED | OUTPATIENT
Start: 2018-07-27 | End: 2018-08-14 | Stop reason: CLARIF

## 2018-07-27 RX ORDER — ALBUTEROL SULFATE 90 UG/1
2 AEROSOL, METERED RESPIRATORY (INHALATION) EVERY 6 HOURS PRN
Qty: 1 INHALER | Refills: 1 | Status: SHIPPED | OUTPATIENT
Start: 2018-07-27 | End: 2018-08-14 | Stop reason: CLARIF

## 2018-07-27 RX ORDER — CYANOCOBALAMIN 1000 UG/ML
1000 INJECTION INTRAMUSCULAR; SUBCUTANEOUS ONCE
Status: COMPLETED | OUTPATIENT
Start: 2018-07-27 | End: 2018-07-27

## 2018-07-27 RX ORDER — LEVOTHYROXINE SODIUM 0.12 MG/1
125 TABLET ORAL DAILY
Qty: 90 TABLET | Refills: 1 | Status: CANCELLED | OUTPATIENT
Start: 2018-07-27

## 2018-07-27 RX ORDER — ATORVASTATIN CALCIUM 10 MG/1
10 TABLET, FILM COATED ORAL DAILY
Qty: 90 TABLET | Refills: 1 | Status: SHIPPED | OUTPATIENT
Start: 2018-07-27 | End: 2018-08-14 | Stop reason: CLARIF

## 2018-07-27 RX ADMIN — CYANOCOBALAMIN 1000 MCG: 1000 INJECTION INTRAMUSCULAR; SUBCUTANEOUS at 11:48

## 2018-07-27 ASSESSMENT — ENCOUNTER SYMPTOMS
WHEEZING: 0
CONSTIPATION: 0
SHORTNESS OF BREATH: 0
DIARRHEA: 0
NAUSEA: 1
VOMITING: 0
COUGH: 0

## 2018-07-27 NOTE — CARE COORDINATION
Ambulatory Care Coordination Note  7/27/2018  CM Risk Score: 9  Hanna Mortality Risk Score:      ACC: Emmanuel Carranza RN    Summary Note:   - met patient during her appointment with Kevin Argueta. - patient is open to care coordination although reluctant on the assistance effectiveness. States that right now she is has been a diabetic for a while and she knows it all and has heard it all  - patient c/o feeling tired all day- thyroid medication changed to name brand  - found that patient has not desire to eat, food does not smell good and makes her nauseous. Discussed with patient different options and decided that she will try peanut butter crackers around 11 am or the Glucerna prescribed by NP. Let her know will look for coupons  - patient state that is paying about $90 for a 3 month supply  - patient taking Lantus 25U in the evening around 7-8pm and sometime modifies the dosage due to her blood sugar  - states that is not taking Humalog with her meals but when she has the sensation of her blood sugar being high the takes 2-6U she said per sliding scale that PCP put her on  - states that testing blood sugar as per her symptoms. Asked for her appointment with endocrinology to test blood sugar fasting and before meals when she takes her Humalog. She said has done before and bs all over the place and stopped. Stressed the importance of taking and writing down for her appointment. Said that she will. Patient chose her log sheet and reviewed how to use and document including her insulin values  - states that 6-7 times a month has hypoglycemic episodes usually around 1-2am. After reviewed found that taking her Humalog after supper and sometimes not eating much for supper. Reviewed with patient the way Humalog works and why used before meals not after and importance of taking blood sugar before meals.  Patient seemed to understand and said will try to take it this way  - reviewed with patient her upcoming appointments with or reschedule if I have to cancel. I will notify my provider of any barriers to my plan of care. I will follow my Zone Management tool to seek urgent or emergent care. I will notify my provider of any symptoms that indicate a worsening of my condition. Barriers: lack of motivation, lack of support, overwhelmed by complexity of regimen, stress and lack of education  Plan for overcoming my barriers: DM folder given to patient  Confidence: 3/10  Anticipated Goal Completion Date: in a month         Self Monitoring   No change (7/27/2018)             Self-Monitored Blood Glucose - I will check my blood sugar Other: FBS, before and/or after meals, before bedtime  I will notify my provider of any trends of increasing or decreasing blood sugars over a 1 month period. I will notify my provider if I have any blood sugar readings less than 70 more than 2 times a month. Patient Reported Blood Glucose : 4-11-18 ; 4-8-18 's, 400's, 270, 70; 4-7-18 HS 51; 4-6-18 HS 42; 4-5-18  and 32      Barriers: overwhelmed by complexity of regimen, stress, time constraints, medication side effects and lack of education  Plan for overcoming my barriers: Establishing with an Endocrinologist  Confidence: 3/10  Anticipated Goal Completion Date:         Wellness Goal   No change (7/27/2018)             Patient Self-Management Goal for Health Maintenance  Goal: I will chose a goal related to tobacco cessation:  I will start using my prescription for Nicoderm and begin reducing the number of cigarettes I smoke daily. Barriers: lack of motivation, stress and poor coping skills  Plan for overcoming my barriers: use my prescription medication and finding healthy coping skills  Confidence: 7/10  Anticipated Goal Completion Date:               Prior to Admission medications    Medication Sig Start Date End Date Taking?  Authorizing Provider   atorvastatin (LIPITOR) 10 MG tablet Take 1 tablet by mouth daily 7/27/18   [facility]   7/30/2018 2:30 PM Carmen Vila MD [facility], [facility]. [facility]   8/14/2018 9:00 AM Danae Rodriguez MD [facility]   10/30/2018 3:00 PM SHAUNA Waller - CNP [facility]

## 2018-07-27 NOTE — PROGRESS NOTES
HPI:  Patient comes in today for   Chief Complaint   Patient presents with    Diabetes     A1c check 10.3        Len Navarro is here today for her diabetes check. More the care coordinator is in the room for the visit. Not eating well, she states she has no interest in food. She only eats dinner. The smell turns her stomach. She also complains of low blood sugars that happen about 6 times a month in the middle of the night. She will not increase her Lantus due her drops at night. She will take Humalog SS if it's high at night and it will drop in the night. Has seen endocrinology in the past, would not go back. Current smoker, 1.5 PPD. Smoking since 11. States she is stressed due to her hip pain and inability to return to work and full activity. Prior to Visit Medications    Medication Sig Taking?  Authorizing Provider   atorvastatin (LIPITOR) 10 MG tablet Take 1 tablet by mouth daily Yes SHAUNA Quevedo CNP   lisinopril (PRINIVIL;ZESTRIL) 2.5 MG tablet Take 1 tablet by mouth daily Yes SHAUNA Quevedo CNP   albuterol sulfate HFA (PROAIR HFA) 108 (90 Base) MCG/ACT inhaler Inhale 2 puffs into the lungs every 6 hours as needed for Wheezing Yes SHAUNA Marvin CNP   insulin lispro (HUMALOG KWIKPEN) 100 UNIT/ML pen Inject 13 Units into the skin 3 times daily (before meals) Yes Historical Provider, MD   insulin glargine (LANTUS SOLOSTAR) 100 UNIT/ML injection pen Inject 25 Units into the skin nightly Yes SHAUNA Quevedo CNP   SYNTHROID 125 MCG tablet Take 1 tablet by mouth Daily Yes SHAUNA Quevedo CNP   Nutritional Supplements (GLUCERNA SHAKE) LIQD Take 1 Bottle by mouth 2 times daily Vanilla flavor Yes SHAUNA Quevedo CNP   Insulin Syringe-Needle U-100 31G X 5/16\" 1 ML MISC 1 each by Does not apply route daily Yes SHAUNA Quevedo CNP   aspirin 325 MG EC tablet Take 1 tablet by mouth 2 times daily Yes Daved Koyanagi, MD   Insulin Pen Needle (PEN NEEDLES) regular rhythm. No murmur heard. Pulmonary/Chest: Effort normal and breath sounds normal.   Abdominal: Soft. Bowel sounds are normal. There is no tenderness. Musculoskeletal: Normal range of motion. Lymphadenopathy:     She has no cervical adenopathy. Neurological: She is alert and oriented to person, place, and time. Skin: Skin is warm and dry. Psychiatric: She has a normal mood and affect. Her behavior is normal.     Assessment/Plan:  Estefania Hurd was seen today for diabetes. Jennifer Peralta, care coordinator, in the room for visit. Diagnoses and all orders for this visit:    Uncontrolled type 2 diabetes mellitus without complication, with long-term current use of insulin (HCC)  -     atorvastatin (LIPITOR) 10 MG tablet; Take 1 tablet by mouth daily  -     lisinopril (PRINIVIL;ZESTRIL) 2.5 MG tablet; Take 1 tablet by mouth daily  - The current medical regimen is effective;  continue present plan and medications. -     POCT glycosylated hemoglobin (Hb A1C)  Nutritional Supplements (GLUCERNA SHAKE) LIQD; Take 1 Bottle by mouth 2 times daily Vanilla flavor  - The patient is asked to make an attempt to improve diet and exercise patterns to aid in medical management of this problem. - I have reviewed diabetes in detail with the patient today. All questions have been answered to her satisfaction. We have discussed the following concepts: home glucose monitoring emphasized, all medications, side effects and compliance discussed carefully, use and side effects of insulin is taught, glycohemoglobin and other lab monitoring discussed, long term diabetic complications discussed and patient urged in the strongest terms to quit smoking. The diabetic Sick Day rules are reviewed with her verbally and in writing. If following usual diet, stay on same dose of diabetic medication, maintain high fluid intake, and perform home glucose monitoring QID.  If not able to maintain normal diet due to illness glucometer readings QID until well and stable, maintain hydration with high fluid intake and call MD if glucose above 400. Insulin: instructions given on proper technique of injection, storing medication, timing of dose. The patient was able to demonstrate adequate skill with doing self injections and home glucose monitoring. Libby Cash 7/27/2018 5:00 PM   - More than 50% of visit spent teaching. Noncompliance        - Patient admits noncompliance with diet and sliding scale insulin. Hypothyroidism, unspecified type  -     SYNTHROID 125 MCG tablet; Take 1 tablet by mouth Daily  - Reviewed side effects of medication and patient verbalizes understanding.   - Advised to call back directly if there are further questions, or if these symptoms fail to improve as anticipated or worsen. SOB (shortness of breath) on exertion  -     albuterol sulfate HFA (PROAIR HFA) 108 (90 Base) MCG/ACT inhaler; Inhale 2 puffs into the lungs every 6 hours as needed for Wheezing  - Reviewed side effects of medication and patient verbalizes understanding.   - Advised to call back directly if there are further questions, or if these symptoms fail to improve as anticipated or worsen. Vitamin B12 deficiency  -     cyanocobalamin injection 1,000 mcg;  Inject 1 mL into the muscle once

## 2018-07-30 ENCOUNTER — INITIAL CONSULT (OUTPATIENT)
Dept: GERIATRIC MEDICINE | Age: 53
End: 2018-07-30
Payer: COMMERCIAL

## 2018-07-30 VITALS
RESPIRATION RATE: 22 BRPM | WEIGHT: 138.8 LBS | SYSTOLIC BLOOD PRESSURE: 104 MMHG | BODY MASS INDEX: 20.56 KG/M2 | DIASTOLIC BLOOD PRESSURE: 54 MMHG | HEIGHT: 69 IN | HEART RATE: 76 BPM | TEMPERATURE: 98.2 F

## 2018-07-30 DIAGNOSIS — R80.9 TYPE 1 DIABETES MELLITUS WITH MICROALBUMINURIA (HCC): ICD-10-CM

## 2018-07-30 DIAGNOSIS — G31.84 MCI (MILD COGNITIVE IMPAIRMENT): Primary | ICD-10-CM

## 2018-07-30 DIAGNOSIS — E10.29 TYPE 1 DIABETES MELLITUS WITH MICROALBUMINURIA (HCC): ICD-10-CM

## 2018-07-30 PROCEDURE — 99212 OFFICE O/P EST SF 10 MIN: CPT | Performed by: INTERNAL MEDICINE

## 2018-07-30 NOTE — PROGRESS NOTES
Here for memory evaluation  amd one year noticible changes like remembering whether she took insulin   MAy forget medicine or that she took meds '  HAd an   Amnesia episodes and lasted minutes   And here alone and  at home   And memory issues since Septic shock episode in 2003  And doing al IADL's  And ADL's including dentures  Question of med compliance   VS stable  Meds reviewed and DM 1 x   Years  With labile control   and originally from [facility] 34 education in [place]   Born in [place]  And some college  Hx of catarax ct sx and , no retinopathy   And Type 1 28 years   Knows dog's name x3 and pet racoon   No neuropathy symptoms   MMSE 28 and clock 4/4 and clock 4/4 and animals 12  Maybe forgetting keys and never organized and may be worse   May be depressed and feels low and useless and lost I  Marc hip [employer] 6 as  and slipped on floor and on workman's compensation   Walks 6 miles a day   New bursitis since hip injury and unable to walk  6 miles   On CBD oil for tremors   LOW B 12 195    Impression: 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

## 2018-08-08 ENCOUNTER — CARE COORDINATION (OUTPATIENT)
Dept: CARE COORDINATION | Age: 53
End: 2018-08-08

## 2018-08-10 ENCOUNTER — CARE COORDINATION (OUTPATIENT)
Dept: CARE COORDINATION | Age: 53
End: 2018-08-10

## 2018-08-14 ENCOUNTER — OFFICE VISIT (OUTPATIENT)
Dept: ENDOCRINOLOGY | Age: 53
End: 2018-08-14
Payer: COMMERCIAL

## 2018-08-14 VITALS
WEIGHT: 146 LBS | HEART RATE: 74 BPM | DIASTOLIC BLOOD PRESSURE: 58 MMHG | RESPIRATION RATE: 16 BRPM | TEMPERATURE: 97.4 F | HEIGHT: 69 IN | BODY MASS INDEX: 21.62 KG/M2 | SYSTOLIC BLOOD PRESSURE: 120 MMHG | OXYGEN SATURATION: 96 %

## 2018-08-14 DIAGNOSIS — G89.29 CHRONIC LEFT HIP PAIN: ICD-10-CM

## 2018-08-14 DIAGNOSIS — Z79.4 TYPE 2 DIABETES MELLITUS WITH HYPOGLYCEMIA WITHOUT COMA, WITH LONG-TERM CURRENT USE OF INSULIN (HCC): Primary | ICD-10-CM

## 2018-08-14 DIAGNOSIS — M25.552 CHRONIC LEFT HIP PAIN: ICD-10-CM

## 2018-08-14 DIAGNOSIS — E11.649 TYPE 2 DIABETES MELLITUS WITH HYPOGLYCEMIA WITHOUT COMA, WITH LONG-TERM CURRENT USE OF INSULIN (HCC): Primary | ICD-10-CM

## 2018-08-14 PROCEDURE — 99204 OFFICE O/P NEW MOD 45 MIN: CPT | Performed by: INTERNAL MEDICINE

## 2018-08-14 RX ORDER — MELOXICAM 15 MG/1
15 TABLET ORAL DAILY
Qty: 30 TABLET | Refills: 3 | Status: SHIPPED | OUTPATIENT
Start: 2018-08-14

## 2018-08-14 RX ORDER — ATORVASTATIN CALCIUM 10 MG/1
10 TABLET, FILM COATED ORAL DAILY
Qty: 90 TABLET | Refills: 1
Start: 2018-08-14

## 2018-08-14 RX ORDER — LISINOPRIL 2.5 MG/1
2.5 TABLET ORAL DAILY
Qty: 90 TABLET | Refills: 1
Start: 2018-08-14

## 2018-08-14 NOTE — PATIENT INSTRUCTIONS
Your goal is to completely eliminate all low blood glucose readings (even if we have to live with BG readings in the 300-400 mg/dl range for a while), to eat three meals daily and to get your depression under control. Contact Dr. Diann Che about your depression and possible medication regimen adjustment  For breakfast, drink one Glucerna shake and eat one piece of toast or 6-8oz of cottage cheese  For lunch, drink one Glucerna shake and eat a grilled cheese sandwich. For dinner, eat the same meal as your . Reduce Lantus to 15 units daily  Reduce Humalog by one-half  Check you blood glucose before every meal and at bedtime. Record these reading on the diary given to you. Take Mobic 15mg once daily  See me back in one month.

## 2018-08-14 NOTE — PROGRESS NOTES
Heart    [] Chest pain with                  exertion  [] Leg swelling  Gastrointestinal    [] Nausea/Vomiting  [] Abdominal pain  [] Constipation   [] Diarrhea   [] Blood in stool      Genitourinary    [] Frequent urination  [] Burning with urination  [] Blood in urine       Blood and Lymphatics    [] Easy bruising  [] Excessive bleeding  [] Swollen lymph nodes       Neurologic    [x] Memory loss   [x] Headaches    [x] Numbness/Tingling              PE:   Gen - BP (!) 120/58 (Site: Right Arm, Position: Sitting, Cuff Size: Medium Adult)   Pulse 74   Temp 97.4 °F (36.3 °C) (Temporal)   Resp 16   Ht 5' 9\" (1.753 m)   Wt 146 lb (66.2 kg)   LMP 11/08/2011   SpO2 96%   BMI 21.56 kg/m²        WN, WD, NAD   Eyes - sclera without injection, no edema of eyelids, EOMI   Neck - no thyroid nodules, trachea midline   Cardio -  RRR without MGR, no LE edema present   Pulm - CTA b/l, nml respiratory effort   Abd - soft, NT/ND, no masses   Neuro - moves all extremities well, biceps reflexes 2+ b/l, patellar reflexes unable to elicit         no deficits in soft touch over extremities   Psych -  nml mood, full affect   H/L - no cervical or subclavian lymphadenopathy    Lab and Imaging:    Results for Fred Hirsch (MRN [id number]) as of 8/14/2018 09:28   Ref. Range 7/2/2018 12:33   Creatinine, Ur Latest Ref Range: 29 - 226 mg/dL 94   Microalbumin Creatinine Ratio Latest Ref Range: 0.0 - 30.0  - (AA)   Microalbumin, Random Urine Latest Ref Range: Not Established mg/L <12.0     Results for Fred Hirsch (MRN [id number]) as of 8/14/2018 09:28   Ref.  Range 3/12/2018 04:36   Sodium Latest Ref Range: 132 - 146 mmol/L 142   Potassium Latest Ref Range: 3.5 - 5.0 mmol/L 4.2   Chloride Latest Ref Range: 98 - 107 mmol/L 100   CO2 Latest Ref Range: 22 - 29 mmol/L 33 (H)   BUN Latest Ref Range: 6 - 20 mg/dL 15   Creatinine Latest Ref Range: 0.5 - 1.0 mg/dL 0.8   Anion Gap Latest Ref Range: 7 - 16 mmol/L 9   GFR Non- Latest

## 2018-08-23 ENCOUNTER — TELEPHONE (OUTPATIENT)
Dept: FAMILY MEDICINE CLINIC | Age: 53
End: 2018-08-23

## 2018-10-05 ENCOUNTER — CARE COORDINATION (OUTPATIENT)
Dept: CARE COORDINATION | Age: 53
End: 2018-10-05

## 2019-01-02 ENCOUNTER — CARE COORDINATION (OUTPATIENT)
Dept: CARE COORDINATION | Age: 54
End: 2019-01-02

## 2019-02-15 ENCOUNTER — CARE COORDINATION (OUTPATIENT)
Dept: CARE COORDINATION | Age: 54
End: 2019-02-15

## 2019-04-16 ENCOUNTER — CARE COORDINATION (OUTPATIENT)
Dept: CARE COORDINATION | Age: 54
End: 2019-04-16

## 2019-04-23 ENCOUNTER — CARE COORDINATION (OUTPATIENT)
Dept: CARE COORDINATION | Age: 54
End: 2019-04-23

## 2019-04-23 NOTE — LETTER
4/23/2019    [address] 51     I have enjoyed working with you to improve your health. I would like to continue to provide you support. However, I have been unable to reach you at, [phone] (home)  and you have not been in the office since 7/27/2018. Please let me know if I can help schedule an office visit for you or answer any questions. Yanique Radford DO is interested in your health and hopes to hear from you soon. If you would like continued access to your Nurse Care Coordinator, Cl Gonsalez RN, you can reach me at [phone]. I will wait to hear from you and will no longer reach out to you. Yanique Radford DO and his/her team will continue to provide care and be available for questions.       In good health,     Cl Gonsalez RN

## 2019-04-23 NOTE — CARE COORDINATION
Patient has not responded to numerous outreaches including phone calls and letters. Letter of discharge/unable to reach letter mailed.

## 2019-11-13 ENCOUNTER — TELEPHONE (OUTPATIENT)
Dept: FAMILY MEDICINE CLINIC | Age: 54
End: 2019-11-13
